# Patient Record
Sex: MALE | Race: WHITE | NOT HISPANIC OR LATINO | Employment: OTHER | URBAN - METROPOLITAN AREA
[De-identification: names, ages, dates, MRNs, and addresses within clinical notes are randomized per-mention and may not be internally consistent; named-entity substitution may affect disease eponyms.]

---

## 2024-06-03 ENCOUNTER — OFFICE VISIT (OUTPATIENT)
Dept: ORTHOPEDIC SURGERY | Facility: CLINIC | Age: 44
End: 2024-06-03
Payer: OTHER GOVERNMENT

## 2024-06-03 VITALS — WEIGHT: 235 LBS | HEIGHT: 71 IN | BODY MASS INDEX: 32.9 KG/M2

## 2024-06-03 DIAGNOSIS — M25.512 LEFT SHOULDER PAIN, UNSPECIFIED CHRONICITY: Primary | ICD-10-CM

## 2024-06-03 PROCEDURE — 99203 OFFICE O/P NEW LOW 30 MIN: CPT | Performed by: ORTHOPAEDIC SURGERY

## 2024-06-03 PROCEDURE — 73030 X-RAY EXAM OF SHOULDER: CPT | Performed by: ORTHOPAEDIC SURGERY

## 2024-06-03 NOTE — PROGRESS NOTES
Subjective:     Patient ID: Justen Brooks is a 44 y.o. male.    Chief Complaint:  Left shoulder pain, new patient  History of Present Illness  Justen Brooks presents to clinic today for evaluation of left shoulder pain, states that back in 2011 he was diagnosed with a SLAP tear that was treated surgically that point in time, had reasonable improvement of symptoms afterwards but over the last 3 months he started noticing increasing pain as well as stiffness and sharpness to the anterior and anterolateral aspects of left shoulder rates as a 2-5 out of 10 shooting in nature, does note some occasional clicking as well as sharp pain particular with resisted activities out away from his body.  He denies any recent injections, minimal improvement with anti-inflammatory medications, activity modification, and home exercises that he has done for greater than 12 weeks.  He denies any fevers chills or sweats.  Denies any numbness or tingling to the left upper extremity.  Denies associated neck pain.     Social History     Occupational History    Not on file   Tobacco Use    Smoking status: Never     Passive exposure: Never    Smokeless tobacco: Never   Vaping Use    Vaping status: Never Used   Substance and Sexual Activity    Alcohol use: No    Drug use: No    Sexual activity: Yes     Partners: Female     Birth control/protection: Vasectomy      Past Medical History:   Diagnosis Date    Acromioclavicular separation 2005    shoulder dislocations    Dislocation, shoulder [id number]    about 150 dislocations    Hip arthrosis 2014    diagnosed right hip impingement    Periarthritis of shoulder 2011    post SLAP repair     Past Surgical History:   Procedure Laterality Date    APPENDECTOMY N/A 09/06/2016    Procedure: APPENDECTOMY LAPAROSCOPIC REPAIR OF UMBILICAL HERNIA;  Surgeon: Jose Carlos Lyn MD;  Location: [facility];  Service:     HERNIA REPAIR      SHOULDER ARTHROSCOPY W/ SUPERIOR LABRAL ANTERIOR POSTERIOR LESION REPAIR  "     SHOULDER SURGERY  July 2011    SLAP repair    VASECTOMY         History reviewed. No pertinent family history.      Review of Systems        Objective:  Vitals:    06/03/24 1522   Weight: 107 kg (235 lb)   Height: 180.3 cm (71\")         06/03/24  1522   Weight: 107 kg (235 lb)     Body mass index is 32.78 kg/m².  Physical Exam    Vital signs reviewed.   General: No acute distress, alert and oriented  Eyes: conjunctiva clear; pupils equally round and reactive  ENT: external ears and nose atraumatic; oropharynx clear  CV: no peripheral edema  Resp: normal respiratory effort  Skin: no rashes or wounds; normal turgor  Psych: mood and affect appropriate; recent and remote memory intact        Ortho Exam     Left shoulder-active forward flexion 175 degrees with 4+ out of 5 strength, external rotation 55 degrees 4+ out of 5 strength, internal rotation T10, 5-5 strength of belly press test.  Significantly positive Yergason and speeds, positive Flint's, minimal tenderness over AC joint with negative crossarm test.  Negative drop arm test, negative external rotation lag sign.  Positive deltoid firing all 3 components.  Brisk cap refill all digits, 1+ radial pulse left wrist.  Positive sensation light touch all distributions left hand symmetric to the right.    Imaging:  Left Shoulder X-Ray  Indication: Pain  AP, scapular Y, and axillary lateral views    Findings:  No fracture  No bony lesion  Normal soft tissues  Minimal glenohumeral chondral joint space narrowing with minimal evidence of reactive bone formation at the inferior glenoid on AP view only, no significant humeral head elevation appreciated    No prior studies were available for comparison.    Assessment:        1. Left shoulder pain, unspecified chronicity           Plan:          Discussed treatment options at length with patient at today's visit.  Reviewed findings from x-rays today and discussed with patient that my concern based on his physical exam as " well as a significant history is for recurrence of SLAP lesion.  At this point in time I recommend advanced imaging with MR arthrogram left shoulder for further evaluation in detail.  Recommended basic home exercises to continue working on motion and strength as tolerated  MR arthrogram left shoulder ordered at this time  Follow up: After MRI to review results and discuss options      Justen Reid Todd was in agreement with plan and had all questions answered.     Orders:  Orders Placed This Encounter   Procedures    XR Shoulder 2+ View Left    FL Contrast Injection CT / MRI    MRI shoulder left arthrogram    IMAGING SCANNED    IMAGING SCANNED    IMAGING SCANNED    IMAGING SCANNED       Medications:  No orders of the defined types were placed in this encounter.      Followup:  No follow-ups on file.    Diagnoses and all orders for this visit:    1. Left shoulder pain, unspecified chronicity (Primary)  -     XR Shoulder 2+ View Left  -     FL Contrast Injection CT / MRI; Future  -     MRI shoulder left arthrogram; Future    Other orders  -     IMAGING SCANNED  -     IMAGING SCANNED  -     IMAGING SCANNED  -     IMAGING SCANNED          Dictated utilizing Dragon dictation

## 2024-06-05 ENCOUNTER — PATIENT ROUNDING (BHMG ONLY) (OUTPATIENT)
Dept: ORTHOPEDIC SURGERY | Facility: CLINIC | Age: 44
End: 2024-06-05
Payer: OTHER GOVERNMENT

## 2024-06-05 NOTE — PROGRESS NOTES
A My-Chart message has been sent to the patient for PATIENT ROUNDING with [facility].

## 2024-06-07 ENCOUNTER — TELEPHONE (OUTPATIENT)
Dept: ORTHOPEDIC SURGERY | Facility: CLINIC | Age: 44
End: 2024-06-07
Payer: OTHER GOVERNMENT

## 2024-06-07 NOTE — TELEPHONE ENCOUNTER
[facility] received a fax that requires your attention. The document has been indexed to the patient’s chart for your review.      Reason for sending: [facility]  REC REQUEST    Documents Description: [facility] REC CRYILSS-OL-8.4.24    [facility] of S[facility]    Date Indexed: 6.7.24    Notes (if needed): WANTS RECORDS FROM 6.3.24 APPT, PLEASE FAX [phone]

## 2024-06-19 ENCOUNTER — HOSPITAL ENCOUNTER (OUTPATIENT)
Dept: GENERAL RADIOLOGY | Facility: HOSPITAL | Age: 44
Discharge: HOME | End: 2024-06-19
Payer: OTHER GOVERNMENT

## 2024-06-19 ENCOUNTER — HOSPITAL ENCOUNTER (OUTPATIENT)
Dept: MRI IMAGING | Facility: HOSPITAL | Age: 44
Discharge: HOME | End: 2024-06-19
Payer: OTHER GOVERNMENT

## 2024-06-19 DIAGNOSIS — M25.512 LEFT SHOULDER PAIN, UNSPECIFIED CHRONICITY: ICD-10-CM

## 2024-06-19 PROCEDURE — 0 GADOBENATE DIMEGLUMINE 529 MG/ML SOLUTION: Performed by: ORTHOPAEDIC SURGERY

## 2024-06-19 PROCEDURE — 77002 NEEDLE LOCALIZATION BY XRAY: CPT

## 2024-06-19 PROCEDURE — A9577 INJ MULTIHANCE: HCPCS | Performed by: ORTHOPAEDIC SURGERY

## 2024-06-19 PROCEDURE — 25510000001 IOPAMIDOL 61 % SOLUTION: Performed by: ORTHOPAEDIC SURGERY

## 2024-06-19 PROCEDURE — 73222 MRI JOINT UPR EXTREM W/DYE: CPT

## 2024-06-19 RX ORDER — LIDOCAINE HYDROCHLORIDE 10 MG/ML
7 INJECTION, SOLUTION INFILTRATION; PERINEURAL ONCE
Status: DISCONTINUED | OUTPATIENT
Start: 2024-06-19 | End: 2024-06-20 | Stop reason: HOSPADM

## 2024-06-19 RX ADMIN — GADOBENATE DIMEGLUMINE 1 ML: 529 INJECTION, SOLUTION INTRAVENOUS at 12:05

## 2024-06-19 RX ADMIN — IOPAMIDOL 8 ML: 612 INJECTION, SOLUTION INTRAVENOUS at 11:52

## 2024-06-27 ENCOUNTER — OFFICE VISIT (OUTPATIENT)
Dept: ORTHOPEDIC SURGERY | Facility: CLINIC | Age: 44
End: 2024-06-27
Payer: OTHER GOVERNMENT

## 2024-06-27 VITALS — BODY MASS INDEX: 32.9 KG/M2 | WEIGHT: 235 LBS | HEIGHT: 71 IN

## 2024-06-27 DIAGNOSIS — S43.432A SUPERIOR GLENOID LABRUM LESION OF LEFT SHOULDER, INITIAL ENCOUNTER: ICD-10-CM

## 2024-06-27 DIAGNOSIS — M75.22 BICEPS TENDONITIS ON LEFT: Primary | ICD-10-CM

## 2024-06-27 DIAGNOSIS — M75.112 INCOMPLETE TEAR OF LEFT ROTATOR CUFF, UNSPECIFIED WHETHER TRAUMATIC: ICD-10-CM

## 2024-06-27 NOTE — PROGRESS NOTES
Subjective:     Patient ID: Justen Brooks is a 44 y.o. male.    Chief Complaint:  Follow-up left shoulder pain, follow-up MRI    History of Present Illness  History of Present Illness  The patient returns to clinic today for follow-up evaluation in regards to his left shoulder.    The patient continues to experience significant pain in his left shoulder, primarily localized over the anterior and anterolateral aspect, accompanied by some radiation to the superior aspect of his left shoulder. He quantifies the pain as moderate in intensity. He also reports associated clicking and catching with sharp pain during resisted activities, particularly when away from his body. He quantifies his pain as a 5 to 6 out of 10 at present. Despite no significant improvement with the use of anti-inflammatory medications, activity modification, and home exercises for over 12 weeks, he denies any associated numbness or tingling and denies any radiation of the pain from the shoulder itself.     Social History     Occupational History    Not on file   Tobacco Use    Smoking status: Never     Passive exposure: Never    Smokeless tobacco: Never   Vaping Use    Vaping status: Never Used   Substance and Sexual Activity    Alcohol use: No    Drug use: Never    Sexual activity: Yes     Partners: Female     Birth control/protection: Vasectomy, Hysterectomy      Past Medical History:   Diagnosis Date    Acromioclavicular separation 2005    shoulder dislocations    Dislocation, shoulder [id number]    about 150 dislocations    Frozen shoulder     Hip arthrosis 2014    diagnosed right hip impingement    Periarthritis of shoulder 2011    post SLAP repair     Past Surgical History:   Procedure Laterality Date    APPENDECTOMY N/A 09/06/2016    Procedure: APPENDECTOMY LAPAROSCOPIC REPAIR OF UMBILICAL HERNIA;  Surgeon: Jose Carlos Lyn MD;  Location: [facility];  Service:     HERNIA REPAIR      SHOULDER ARTHROSCOPY W/ SUPERIOR LABRAL ANTERIOR POSTERIOR  "LESION REPAIR      SHOULDER SURGERY  July 2011    SLAP repair    VASECTOMY         History reviewed. No pertinent family history.      Review of Systems        Objective:  Vitals:    06/27/24 0901   Weight: 107 kg (235 lb)   Height: 180.3 cm (71\")         06/27/24 0901   Weight: 107 kg (235 lb)     Body mass index is 32.78 kg/m².  Physical Exam    Vital signs reviewed.   General: No acute distress, alert and oriented  Eyes: conjunctiva clear; pupils equally round and reactive  ENT: external ears and nose atraumatic; oropharynx clear  CV: no peripheral edema  Resp: normal respiratory effort  Skin: no rashes or wounds; normal turgor  Psych: mood and affect appropriate; recent and remote memory intact          Physical Exam  Left shoulder- active forward flexion in the left shoulder is 175 to 175 degrees, 4+ out of 5 strength, external rotation 55 degrees, 4 plus out of 5 strength, internal rotation T10, 5 out of 5 strength, belly press test with negative bear hug sign. Significantly positive Yergason and Speed's, positive Breckinridge's, no tenderness over AC joint, but negative cross arm test, negative drop arm test, negative external rotation lag sign, positive deltoid in all three components.         Imaging:    FL Contrast Injection CT / MRI    Result Date: 6/19/2024  Technically successful left shoulder arthrogram utilizing fluoroscopic guidance. MRI to follow. Please see left shoulder MRI for complete read. Report dictated by: Tramaine Son DNP  I have personally reviewed this case and agree with the findings above: Electronically Signed: Subhash Webster MD  6/19/2024 3:29 PM EDT  Workstation ID: [id number]    MRI Shoulder Left Arthrogram    Result Date: 6/19/2024  Impression: 1.Evidence of partial-thickness undersurface and intrasubstance tear involving the superior and middle fibers of the subscapularis component of the cuff. No definitive full-thickness rotator cuff tear is seen. 2.Evidence for associated biceps " pulley/sleeve injury with slight partial displacement of the proximal biceps tendon. Changes of biceps tendinopathy are also noted. There is no complete biceps tendon tear or distal retraction. 3.There are residual signal changes associated with the superior labrum related to prior SLAP type tear and postoperative change. 4.There also appear to be changes suggesting the sequelae of prior transient anterior inferior glenohumeral dislocation with remote Bankart and Hill-Sachs injuries as well as postoperative changes along the anterior inferior glenoid/labrum. 5.Mild hypertrophic age-related changes of the acromioclavicular joint with mild edema suggesting chronic acromioclavicular strain or chronic stress related changes. 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 Electronically Signed: Subhash Webster MD  6/19/2024 2:37 PM EDT  Workstation ID: [id number]    Review of outside MR arthrogram left shoulder include reviewed imaging as well as radiology report indicates partial-thickness tearing of the subscapularis but no evidence of full-thickness rotator cuff tear, significant biceps tendinopathy with displacement of proximal biceps tendon as well as SLAP tear superior labrum.  Prior evidence of shoulder instability with remote Bankart and Hill-Sachs lesions.  Mild early degenerative change of glenohumeral joint.    Assessment:        1. Biceps tendonitis on left    2. Superior glenoid labrum lesion of left shoulder, initial encounter    3. Incomplete tear of left rotator cuff, unspecified whether traumatic           Plan:          Assessment & Plan  1. Left shoulder pain.  Upon reviewing the patient's MR arthrogram findings, it was observed that he has a remote history of Bankart injury and Hill-Sachs defect, consistent with his significant prior history of shoulder instability. Currently, he is not experiencing instability, but there is evidence of significant biceps tendon pathology, partial subluxation  from the bicipital groove, and a SLAP lesion. The majority of his clinical symptoms are attributable to his pain. A discussion was held regarding the options of observation, injection, and surgical treatment. The patient expressed a desire to proceed with surgery at this time. The proposed plan for surgery is a left shoulder arthroscopy with biceps tenodesis, subacromial decompression, possible rotator cuff repair, and all associated procedures.      Plan will be for left shoulder arthroscopy, biceps tenodesis, subacromial decompression, possible rotator cuff debridement versus repair and all associated procedures.  I reviewed risks benefits and alternatives the procedure with risks including not limited to neurovascular damage, bleeding, infection, chronic pain, re-tear rotator cuff, failure of healing rotator cuff, loss of motion, weakness, stiffness, instability, biceps sag, DVT, pulmonary embolus, death, stroke, complex regional pain syndrome, myocardial infarction, need for additional procedures. He understood all these had all questions answered.  Patient verbally consented to proceed with surgery.  No guarantees were given regarding results of surgery.  We will have patient medically optimized by primary care physician and proceed with surgery at next available date.     Patient denies history of DVT or pulmonary embolus, denies cardiac history, he is not diabetic    Justen Brooks was in agreement with plan and had all questions answered.     Orders:  No orders of the defined types were placed in this encounter.      Medications:  No orders of the defined types were placed in this encounter.      Followup:  No follow-ups on file.    Diagnoses and all orders for this visit:    1. Biceps tendonitis on left (Primary)    2. Superior glenoid labrum lesion of left shoulder, initial encounter    3. Incomplete tear of left rotator cuff, unspecified whether traumatic          Dictated utilizing Dragon dictation      Patient or patient representative verbalized consent for the use of Ambient Listening during the visit with  Casey Vicente MD for chart documentation. 6/28/2024  09:29 EDT

## 2024-06-28 RX ORDER — MELOXICAM 7.5 MG/1
15 TABLET ORAL ONCE
OUTPATIENT
Start: 2024-06-28 | End: 2024-06-28

## 2024-06-28 RX ORDER — PREGABALIN 150 MG/1
150 CAPSULE ORAL ONCE
OUTPATIENT
Start: 2024-06-28 | End: 2024-06-28

## 2024-06-28 RX ORDER — ACETAMINOPHEN 325 MG/1
1000 TABLET ORAL ONCE
OUTPATIENT
Start: 2024-06-28 | End: 2024-06-28

## 2024-07-02 PROBLEM — M75.22 BICEPS TENDONITIS ON LEFT: Status: ACTIVE | Noted: 2024-06-27

## 2024-07-02 PROBLEM — S43.432A SUPERIOR GLENOID LABRUM LESION OF LEFT SHOULDER: Status: ACTIVE | Noted: 2024-06-27

## 2024-07-02 PROBLEM — M75.112 INCOMPLETE TEAR OF LEFT ROTATOR CUFF: Status: ACTIVE | Noted: 2024-06-27

## 2024-07-26 ENCOUNTER — PRE-ADMISSION TESTING (OUTPATIENT)
Dept: PREADMISSION TESTING | Facility: HOSPITAL | Age: 44
End: 2024-07-26
Payer: OTHER GOVERNMENT

## 2024-07-26 VITALS
OXYGEN SATURATION: 99 % | HEIGHT: 72 IN | HEART RATE: 90 BPM | WEIGHT: 244 LBS | SYSTOLIC BLOOD PRESSURE: 140 MMHG | BODY MASS INDEX: 33.05 KG/M2 | RESPIRATION RATE: 16 BRPM | DIASTOLIC BLOOD PRESSURE: 64 MMHG

## 2024-07-26 LAB — HBA1C MFR BLD: 5.86 % (ref 4.8–5.6)

## 2024-07-26 PROCEDURE — 85025 COMPLETE CBC W/AUTO DIFF WBC: CPT | Performed by: ORTHOPAEDIC SURGERY

## 2024-07-26 PROCEDURE — 83036 HEMOGLOBIN GLYCOSYLATED A1C: CPT | Performed by: ORTHOPAEDIC SURGERY

## 2024-07-26 PROCEDURE — 85610 PROTHROMBIN TIME: CPT | Performed by: ORTHOPAEDIC SURGERY

## 2024-07-26 PROCEDURE — 85730 THROMBOPLASTIN TIME PARTIAL: CPT | Performed by: ORTHOPAEDIC SURGERY

## 2024-07-26 PROCEDURE — 80048 BASIC METABOLIC PNL TOTAL CA: CPT | Performed by: ORTHOPAEDIC SURGERY

## 2024-07-26 NOTE — PAT
Pt here for PAT visit.  Pre-op tests completed, chg soap given, and instructions reviewed.  Instructed clears until 2 hrs prior to arrival time, voiced understanding. Instructed on Benzoyl Peroxide, handout given and pt directed to pharmacy

## 2024-07-26 NOTE — DISCHARGE INSTRUCTIONS
PRE-ADMISSION TESTING INSTRUCTIONS FOR ADULTS    Take these medications the morning of surgery with a small sip of water:  nothing       Do not take any insulin or diabetes medications the morning of surgery.      No aspirin, advil, aleve, ibuprofen, naproxen, diet pills, decongestants, or herbal/vitamins for a week prior to surgery.       Tylenol/Acetaminophen is okay to take if needed.    General Instructions:    DO NOT EAT SOLID FOOD AFTER MIDNIGHT THE NIGHT BEFORE SURGERY. No gum, mints, or hard candy after midnight the night before surgery.  You may drink clear liquids the day of surgery up until 2 hours before your arrival time.  Clear liquids are liquids you can see through. Nothing RED in color.    Plain water    Sports drinks      Gelatin (Jell-O)  Fruit juices without pulp such as white grape juice and apple juice  Popsicles that contain no fruit or yogurt  Tea or coffee (no cream or milk added)    It is beneficial for you to have a clear drink that contains carbohydrates 2 hours before your arrival time.  We suggest a 20 ounce bottle of Gatorade or Powerade for non-diabetic patients or a 20 ounce bottle of Gatorade Zero or Powerade Zero for diabetic patients.     Patients who avoid smoking, chewing tobacco and alcohol for 4 weeks prior to surgery have a reduced risk of post-operative complications.  If at all possible, quit smoking as many days before surgery as you can.    Do not smoke, use chewing tobacco or drink alcohol the day of surgery    Bring your C-PAP/ BI-PAP machine if you use one.  Wear clean comfortable clothes.  Do not wear contact lenses, lotion, deodorant, or make-up.  Bring a case for your glasses if applicable. You may brush your teeth the morning of surgery.  You may wear dentures/partials, do not put adhesive/glue on them.  Leave all other jewelry and valuables at home.      Preventing a Surgical Site Infection:    Shower the night before and on the morning of surgery using the  chlorhexidine soap you were given.  Use a clean washcloth with the soap.  Place clean sheets on your bed after showering the night before surgery. Do not use the CHG soap on your hair, face, or private areas. Wash your body gently for five (5) minutes. Do not scrub your skin.  Dry with a clean towel and dress in clean clothing.  Do not shave the surgical area for 10 days-2 weeks prior to surgery  because the razor can irritate skin and make it easier to develop an infection.  Make sure you, your family, and all healthcare providers clean their hands with soap and water or an alcohol based hand  before caring for you or your wound.      Day of surgery:    Your surgeon’s office will advise you of your arrival time for the day of surgery.    Upon arrival, a Pre-op nurse and Anesthesia provider will review your health history, obtain vital signs, and answer questions you may have. The anesthesia provider will also discuss the type of anesthesia that will be needed for your procedure, which may include general anesthesia. The only belongings needed at this time will be your home medications and if applicable your C-PAP/BI-PAP machine.  If you are staying overnight your family can leave the rest of your belongings in the car and bring them to your room later.  A Pre-op nurse will start an IV and you may receive medication in preparation for surgery, including something to help you relax.  Your family will be able to see you in the Pre-op area.  While you are in surgery your family should notify the waiting room  if they leave the waiting room area and provide a contact phone number.    IF you have any questions, you can call the Pre-Admission Department at [phone] or your surgeon's office.  Notify your surgeon if  you become sick, have a fever, productive cough, or cannot be here the day of surgery    Please be aware that surgery does come with discomfort.  We want to make every effort to  control your discomfort so please discuss any uncontrolled symptoms with your nurse.   Your doctor will most likely have prescribed pain medications.      If you are going home after surgery, you will receive individualized written care instructions before being discharged.  A responsible adult (over the age of 18) must drive you to and from the hospital on the day of your surgery and stay with you for 24 hours after anesthesia.    If you are staying overnight following surgery, you will be transported to your hospital room following the recovery period.  [facility] has all private rooms.    You may receive a survey regarding the care you received. Your feedback is very important and will be used to collect the necessary data to help us to continue to provide excellent care.     Deductibles and co-payments are collected on the day of service. Please be prepared to pay the required co-pay, deductible or deposit on the day of service as defined by your plan.

## 2024-08-01 ENCOUNTER — ANESTHESIA EVENT (OUTPATIENT)
Dept: PERIOP | Facility: HOSPITAL | Age: 44
End: 2024-08-01
Payer: OTHER GOVERNMENT

## 2024-08-02 ENCOUNTER — HOSPITAL ENCOUNTER (OUTPATIENT)
Facility: HOSPITAL | Age: 44
Setting detail: HOSPITAL OUTPATIENT SURGERY
Discharge: HOME | End: 2024-08-02
Attending: ORTHOPAEDIC SURGERY | Admitting: ORTHOPAEDIC SURGERY
Payer: OTHER GOVERNMENT

## 2024-08-02 ENCOUNTER — ANESTHESIA (OUTPATIENT)
Dept: PERIOP | Facility: HOSPITAL | Age: 44
End: 2024-08-02
Payer: OTHER GOVERNMENT

## 2024-08-02 VITALS
OXYGEN SATURATION: 91 % | SYSTOLIC BLOOD PRESSURE: 113 MMHG | BODY MASS INDEX: 32.09 KG/M2 | TEMPERATURE: 97.3 F | DIASTOLIC BLOOD PRESSURE: 72 MMHG | HEART RATE: 73 BPM | RESPIRATION RATE: 16 BRPM | WEIGHT: 236.6 LBS

## 2024-08-02 DIAGNOSIS — S43.432A SUPERIOR GLENOID LABRUM LESION OF LEFT SHOULDER, INITIAL ENCOUNTER: ICD-10-CM

## 2024-08-02 DIAGNOSIS — M75.22 BICEPS TENDONITIS ON LEFT: ICD-10-CM

## 2024-08-02 DIAGNOSIS — M75.112 INCOMPLETE TEAR OF LEFT ROTATOR CUFF, UNSPECIFIED WHETHER TRAUMATIC: ICD-10-CM

## 2024-08-02 PROCEDURE — 25010000002 CEFAZOLIN PER 500 MG: Performed by: ORTHOPAEDIC SURGERY

## 2024-08-02 PROCEDURE — 25810000003 LACTATED RINGERS PER 1000 ML: Performed by: NURSE ANESTHETIST, CERTIFIED REGISTERED

## 2024-08-02 PROCEDURE — 25010000002 SUCCINYLCHOLINE PER 20 MG: Performed by: NURSE ANESTHETIST, CERTIFIED REGISTERED

## 2024-08-02 PROCEDURE — 25010000002 ONDANSETRON PER 1 MG: Performed by: NURSE ANESTHETIST, CERTIFIED REGISTERED

## 2024-08-02 PROCEDURE — 25010000002 MIDAZOLAM PER 1MG: Performed by: NURSE ANESTHETIST, CERTIFIED REGISTERED

## 2024-08-02 PROCEDURE — 25010000002 PROPOFOL 200 MG/20ML EMULSION: Performed by: NURSE ANESTHETIST, CERTIFIED REGISTERED

## 2024-08-02 PROCEDURE — 25010000002 EPINEPHRINE PER 0.1 MG: Performed by: ORTHOPAEDIC SURGERY

## 2024-08-02 PROCEDURE — 25010000002 SUGAMMADEX 200 MG/2ML SOLUTION: Performed by: NURSE ANESTHETIST, CERTIFIED REGISTERED

## 2024-08-02 PROCEDURE — 25010000002 HYDROMORPHONE 1 MG/ML SOLUTION: Performed by: NURSE ANESTHETIST, CERTIFIED REGISTERED

## 2024-08-02 PROCEDURE — 29827 SHO ARTHRS SRG RT8TR CUF RPR: CPT | Performed by: ORTHOPAEDIC SURGERY

## 2024-08-02 PROCEDURE — 23430 REPAIR BICEPS TENDON: CPT | Performed by: ORTHOPAEDIC SURGERY

## 2024-08-02 PROCEDURE — 25010000002 BUPIVACAINE (PF) 0.5 % SOLUTION: Performed by: NURSE ANESTHETIST, CERTIFIED REGISTERED

## 2024-08-02 PROCEDURE — 25010000002 DEXAMETHASONE PER 1 MG: Performed by: NURSE ANESTHETIST, CERTIFIED REGISTERED

## 2024-08-02 PROCEDURE — C1713 ANCHOR/SCREW BN/BN,TIS/BN: HCPCS | Performed by: ORTHOPAEDIC SURGERY

## 2024-08-02 DEVICE — ULTRATAPE SUTURE COBRAID BLUE, 6                                    PER BOX
Type: IMPLANTABLE DEVICE | Site: SHOULDER | Status: FUNCTIONAL
Brand: ULTRATAPE

## 2024-08-02 DEVICE — 2.8MM Q-FIX ALL SUTURE ANCHOR
Type: IMPLANTABLE DEVICE | Site: SHOULDER | Status: FUNCTIONAL
Brand: Q-FIX

## 2024-08-02 RX ORDER — ONDANSETRON 4 MG/1
4 TABLET, FILM COATED ORAL EVERY 8 HOURS PRN
Qty: 30 TABLET | Refills: 0 | Status: SHIPPED | OUTPATIENT
Start: 2024-08-02 | End: 2024-08-02

## 2024-08-02 RX ORDER — SODIUM CHLORIDE 0.9 % (FLUSH) 0.9 %
10 SYRINGE (ML) INJECTION EVERY 12 HOURS SCHEDULED
Status: DISCONTINUED | OUTPATIENT
Start: 2024-08-02 | End: 2024-08-02 | Stop reason: HOSPADM

## 2024-08-02 RX ORDER — FAMOTIDINE 10 MG/ML
20 INJECTION, SOLUTION INTRAVENOUS
Status: COMPLETED | OUTPATIENT
Start: 2024-08-02 | End: 2024-08-02

## 2024-08-02 RX ORDER — LIDOCAINE HYDROCHLORIDE 10 MG/ML
0.5 INJECTION, SOLUTION INFILTRATION; PERINEURAL ONCE AS NEEDED
Status: DISCONTINUED | OUTPATIENT
Start: 2024-08-02 | End: 2024-08-02 | Stop reason: HOSPADM

## 2024-08-02 RX ORDER — BUPIVACAINE HYDROCHLORIDE 5 MG/ML
INJECTION, SOLUTION EPIDURAL; INTRACAUDAL
Status: COMPLETED | OUTPATIENT
Start: 2024-08-02 | End: 2024-08-02

## 2024-08-02 RX ORDER — HYDROCODONE BITARTRATE AND ACETAMINOPHEN 7.5; 325 MG/1; MG/1
1 TABLET ORAL ONCE AS NEEDED
Status: DISCONTINUED | OUTPATIENT
Start: 2024-08-02 | End: 2024-08-02 | Stop reason: HOSPADM

## 2024-08-02 RX ORDER — SENNOSIDES A AND B 8.6 MG/1
1 TABLET, FILM COATED ORAL NIGHTLY
Qty: 20 TABLET | Refills: 0 | Status: SHIPPED | OUTPATIENT
Start: 2024-08-02

## 2024-08-02 RX ORDER — ONDANSETRON 2 MG/ML
4 INJECTION INTRAMUSCULAR; INTRAVENOUS ONCE AS NEEDED
Status: COMPLETED | OUTPATIENT
Start: 2024-08-02 | End: 2024-08-02

## 2024-08-02 RX ORDER — MELOXICAM 7.5 MG/1
15 TABLET ORAL ONCE
Status: COMPLETED | OUTPATIENT
Start: 2024-08-02 | End: 2024-08-02

## 2024-08-02 RX ORDER — DEXMEDETOMIDINE HYDROCHLORIDE 100 UG/ML
INJECTION, SOLUTION INTRAVENOUS
Status: COMPLETED | OUTPATIENT
Start: 2024-08-02 | End: 2024-08-02

## 2024-08-02 RX ORDER — LIDOCAINE HYDROCHLORIDE 20 MG/ML
INJECTION, SOLUTION INFILTRATION; PERINEURAL AS NEEDED
Status: DISCONTINUED | OUTPATIENT
Start: 2024-08-02 | End: 2024-08-02 | Stop reason: SURG

## 2024-08-02 RX ORDER — MIDAZOLAM HYDROCHLORIDE 2 MG/2ML
1 INJECTION, SOLUTION INTRAMUSCULAR; INTRAVENOUS
Status: COMPLETED | OUTPATIENT
Start: 2024-08-02 | End: 2024-08-02

## 2024-08-02 RX ORDER — HYDROCODONE BITARTRATE AND ACETAMINOPHEN 5; 325 MG/1; MG/1
1 TABLET ORAL EVERY 4 HOURS PRN
Qty: 40 TABLET | Refills: 0 | Status: SHIPPED | OUTPATIENT
Start: 2024-08-02

## 2024-08-02 RX ORDER — SENNOSIDES A AND B 8.6 MG/1
1 TABLET, FILM COATED ORAL NIGHTLY
Qty: 20 TABLET | Refills: 0 | Status: SHIPPED | OUTPATIENT
Start: 2024-08-02 | End: 2024-08-02

## 2024-08-02 RX ORDER — OXYCODONE HYDROCHLORIDE AND ACETAMINOPHEN 5; 325 MG/1; MG/1
1 TABLET ORAL EVERY 4 HOURS PRN
Qty: 30 TABLET | Refills: 0 | Status: SHIPPED | OUTPATIENT
Start: 2024-08-02 | End: 2024-08-02

## 2024-08-02 RX ORDER — ACETAMINOPHEN 500 MG
1000 TABLET ORAL ONCE
Status: COMPLETED | OUTPATIENT
Start: 2024-08-02 | End: 2024-08-02

## 2024-08-02 RX ORDER — SUCCINYLCHOLINE CHLORIDE 20 MG/ML
INJECTION INTRAMUSCULAR; INTRAVENOUS AS NEEDED
Status: DISCONTINUED | OUTPATIENT
Start: 2024-08-02 | End: 2024-08-02 | Stop reason: SURG

## 2024-08-02 RX ORDER — ONDANSETRON 4 MG/1
4 TABLET, FILM COATED ORAL EVERY 8 HOURS PRN
Qty: 30 TABLET | Refills: 0 | Status: SHIPPED | OUTPATIENT
Start: 2024-08-02

## 2024-08-02 RX ORDER — DEXAMETHASONE SODIUM PHOSPHATE 10 MG/ML
8 INJECTION INTRAMUSCULAR; INTRAVENOUS ONCE AS NEEDED
Status: COMPLETED | OUTPATIENT
Start: 2024-08-02 | End: 2024-08-02

## 2024-08-02 RX ORDER — SODIUM CHLORIDE, SODIUM LACTATE, POTASSIUM CHLORIDE, CALCIUM CHLORIDE 600; 310; 30; 20 MG/100ML; MG/100ML; MG/100ML; MG/100ML
9 INJECTION, SOLUTION INTRAVENOUS CONTINUOUS PRN
Status: DISCONTINUED | OUTPATIENT
Start: 2024-08-02 | End: 2024-08-02 | Stop reason: HOSPADM

## 2024-08-02 RX ORDER — LIDOCAINE HYDROCHLORIDE AND EPINEPHRINE 10; 10 MG/ML; UG/ML
INJECTION, SOLUTION INFILTRATION; PERINEURAL AS NEEDED
Status: DISCONTINUED | OUTPATIENT
Start: 2024-08-02 | End: 2024-08-02 | Stop reason: HOSPADM

## 2024-08-02 RX ORDER — PROPOFOL 10 MG/ML
INJECTION, EMULSION INTRAVENOUS AS NEEDED
Status: DISCONTINUED | OUTPATIENT
Start: 2024-08-02 | End: 2024-08-02 | Stop reason: SURG

## 2024-08-02 RX ORDER — SODIUM CHLORIDE 0.9 % (FLUSH) 0.9 %
10 SYRINGE (ML) INJECTION AS NEEDED
Status: DISCONTINUED | OUTPATIENT
Start: 2024-08-02 | End: 2024-08-02 | Stop reason: HOSPADM

## 2024-08-02 RX ORDER — ROCURONIUM BROMIDE 10 MG/ML
INJECTION, SOLUTION INTRAVENOUS AS NEEDED
Status: DISCONTINUED | OUTPATIENT
Start: 2024-08-02 | End: 2024-08-02 | Stop reason: SURG

## 2024-08-02 RX ORDER — EPINEPHRINE 1 MG/ML
INJECTION, SOLUTION, CONCENTRATE INTRAVENOUS AS NEEDED
Status: DISCONTINUED | OUTPATIENT
Start: 2024-08-02 | End: 2024-08-02 | Stop reason: HOSPADM

## 2024-08-02 RX ORDER — PREGABALIN 75 MG/1
150 CAPSULE ORAL ONCE
Status: COMPLETED | OUTPATIENT
Start: 2024-08-02 | End: 2024-08-02

## 2024-08-02 RX ORDER — OXYCODONE HYDROCHLORIDE AND ACETAMINOPHEN 5; 325 MG/1; MG/1
1 TABLET ORAL EVERY 4 HOURS PRN
Qty: 30 TABLET | Refills: 0 | Status: SHIPPED | OUTPATIENT
Start: 2024-08-02 | End: 2024-08-02 | Stop reason: HOSPADM

## 2024-08-02 RX ORDER — ASPIRIN 81 MG/1
81 TABLET ORAL DAILY
Qty: 14 TABLET | Refills: 0 | Status: SHIPPED | OUTPATIENT
Start: 2024-08-02 | End: 2024-08-02

## 2024-08-02 RX ORDER — SODIUM CHLORIDE, SODIUM LACTATE, POTASSIUM CHLORIDE, CALCIUM CHLORIDE 600; 310; 30; 20 MG/100ML; MG/100ML; MG/100ML; MG/100ML
100 INJECTION, SOLUTION INTRAVENOUS ONCE
Status: DISCONTINUED | OUTPATIENT
Start: 2024-08-02 | End: 2024-08-02 | Stop reason: HOSPADM

## 2024-08-02 RX ORDER — ASPIRIN 81 MG/1
81 TABLET ORAL DAILY
Qty: 14 TABLET | Refills: 0 | Status: SHIPPED | OUTPATIENT
Start: 2024-08-02

## 2024-08-02 RX ORDER — ONDANSETRON 2 MG/ML
4 INJECTION INTRAMUSCULAR; INTRAVENOUS ONCE AS NEEDED
Status: DISCONTINUED | OUTPATIENT
Start: 2024-08-02 | End: 2024-08-02 | Stop reason: HOSPADM

## 2024-08-02 RX ADMIN — CEFAZOLIN 2000 MG: 2 INJECTION, POWDER, FOR SOLUTION INTRAVENOUS at 14:12

## 2024-08-02 RX ADMIN — SUGAMMADEX 200 MG: 100 INJECTION, SOLUTION INTRAVENOUS at 15:40

## 2024-08-02 RX ADMIN — PREGABALIN 150 MG: 75 CAPSULE ORAL at 11:02

## 2024-08-02 RX ADMIN — LIDOCAINE HYDROCHLORIDE 50 MG: 20 INJECTION, SOLUTION INFILTRATION; PERINEURAL at 14:06

## 2024-08-02 RX ADMIN — ROCURONIUM 25 MG: 50 INJECTION, SOLUTION INTRAVENOUS at 14:17

## 2024-08-02 RX ADMIN — ACETAMINOPHEN 1000 MG: 500 TABLET ORAL at 11:02

## 2024-08-02 RX ADMIN — FAMOTIDINE 20 MG: 10 INJECTION, SOLUTION INTRAVENOUS at 11:00

## 2024-08-02 RX ADMIN — SUCCINYLCHOLINE CHLORIDE 140 MG: 20 INJECTION, SOLUTION INTRAMUSCULAR; INTRAVENOUS at 14:07

## 2024-08-02 RX ADMIN — PROPOFOL 200 MG: 10 INJECTION, EMULSION INTRAVENOUS at 14:06

## 2024-08-02 RX ADMIN — DEXMEDETOMIDINE 20 MCG: 100 INJECTION, SOLUTION, CONCENTRATE INTRAVENOUS at 13:10

## 2024-08-02 RX ADMIN — MIDAZOLAM HYDROCHLORIDE 1 MG: 1 INJECTION, SOLUTION INTRAMUSCULAR; INTRAVENOUS at 12:55

## 2024-08-02 RX ADMIN — DEXAMETHASONE SODIUM PHOSPHATE 8 MG: 10 INJECTION INTRAMUSCULAR; INTRAVENOUS at 11:03

## 2024-08-02 RX ADMIN — MIDAZOLAM HYDROCHLORIDE 1 MG: 1 INJECTION, SOLUTION INTRAMUSCULAR; INTRAVENOUS at 13:00

## 2024-08-02 RX ADMIN — HYDROMORPHONE HYDROCHLORIDE 0.5 MG: 1 INJECTION, SOLUTION INTRAMUSCULAR; INTRAVENOUS; SUBCUTANEOUS at 17:08

## 2024-08-02 RX ADMIN — MELOXICAM 15 MG: 7.5 TABLET ORAL at 11:02

## 2024-08-02 RX ADMIN — ONDANSETRON 4 MG: 2 INJECTION INTRAMUSCULAR; INTRAVENOUS at 11:00

## 2024-08-02 RX ADMIN — SODIUM CHLORIDE, POTASSIUM CHLORIDE, SODIUM LACTATE AND CALCIUM CHLORIDE 9 ML/HR: 600; 310; 30; 20 INJECTION, SOLUTION INTRAVENOUS at 10:55

## 2024-08-02 RX ADMIN — HYDROMORPHONE HYDROCHLORIDE 0.5 MG: 1 INJECTION, SOLUTION INTRAMUSCULAR; INTRAVENOUS; SUBCUTANEOUS at 16:58

## 2024-08-02 RX ADMIN — BUPIVACAINE HYDROCHLORIDE 20 ML: 5 INJECTION, SOLUTION EPIDURAL; INTRACAUDAL; PERINEURAL at 13:10

## 2024-08-02 NOTE — H&P
Orthopedic H&P    Subjective:     Patient ID: Justen Brooks is a 44 y.o. male.    Chief Complaint:  Left shoulder pain, biceps tendinopathy, SLAP lesion, partial-thickness rotator cuff tear    History of Present Illness  History of Present Illness  The patient presents for surgical treatment of left shoulder pain and biceps tendinopathy, SLAP lesion, and partial-thickness rotator cuff tear    The patient is experiencing significant pain in his left shoulder, primarily localized over the anterior and anterolateral aspect, accompanied by some radiation to the superior aspect of his left shoulder. He quantifies the pain as moderate in intensity. He also reports associated clicking and catching with sharp pain during resisted activities, particularly when away from his body. He quantifies his pain as a 5 to 6 out of 10 at present. Despite no significant improvement with the use of anti-inflammatory medications, activity modification, and home exercises for over 12 weeks, he denies any associated numbness or tingling and denies any radiation of the pain from the shoulder itself.    No current facility-administered medications on file prior to encounter.     No current outpatient medications on file prior to encounter.     No Known Allergies       Social History     Occupational History    Not on file   Tobacco Use    Smoking status: Never     Passive exposure: Never    Smokeless tobacco: Never   Vaping Use    Vaping status: Never Used   Substance and Sexual Activity    Alcohol use: No    Drug use: Never    Sexual activity: Yes     Partners: Female     Birth control/protection: Vasectomy, Hysterectomy      Past Medical History:   Diagnosis Date    Acromioclavicular separation 2005    shoulder dislocations    Dislocation, shoulder [id number]    about 150 dislocations    Frozen shoulder     Hip arthrosis 2014    diagnosed right hip impingement    Periarthritis of shoulder 2011    post SLAP repair    PONV (postoperative  nausea and vomiting)     Sleep apnea     CPAP     Past Surgical History:   Procedure Laterality Date    APPENDECTOMY N/A 09/06/2016    Procedure: APPENDECTOMY LAPAROSCOPIC REPAIR OF UMBILICAL HERNIA;  Surgeon: Jose Carlos Lyn MD;  Location: [facility] OR;  Service:     HERNIA REPAIR      SHOULDER ARTHROSCOPY W/ SUPERIOR LABRAL ANTERIOR POSTERIOR LESION REPAIR      SHOULDER SURGERY  July 2011    SLAP repair    VASECTOMY         History reviewed. No pertinent family history.      Review of Systems        Objective:  Vitals:    08/02/24 1039 08/02/24 1047   BP:  129/79   Pulse:  80   Resp:  20   Temp: 98.1 °F (36.7 °C) 98.1 °F (36.7 °C)   TempSrc: Oral Oral   SpO2:  94%   Weight: 106 kg (233 lb 9.6 oz) 107 kg (236 lb 9.6 oz)           08/02/24  1039 08/02/24  1047   Weight: 106 kg (233 lb 9.6 oz) 107 kg (236 lb 9.6 oz)       Body mass index is 32.09 kg/m².  Physical Exam    Vital signs reviewed.   General: No acute distress, alert and oriented  Eyes: conjunctiva clear; pupils equally round and reactive  ENT: external ears and nose atraumatic; oropharynx clear  CV: no peripheral edema  Resp: normal respiratory effort  Skin: no rashes or wounds; normal turgor  Psych: mood and affect appropriate; recent and remote memory intact  Debilities: None        Physical Exam  Left shoulder- active forward flexion in the left shoulder is 175 to 175 degrees, 4+ out of 5 strength, external rotation 55 degrees, 4 plus out of 5 strength, internal rotation T10, 5 out of 5 strength, belly press test with negative bear hug sign. Significantly positive Yergason and Speed's, positive Coplay's, no tenderness over AC joint, but negative cross arm test, negative drop arm test, negative external rotation lag sign, positive deltoid in all three components.         Imaging:    FL Contrast Injection CT / MRI    Result Date: 6/19/2024  Technically successful left shoulder arthrogram utilizing fluoroscopic guidance. MRI to follow. Please see left  shoulder MRI for complete read. Report dictated by: Tramaine Son DNP  I have personally reviewed this case and agree with the findings above: Electronically Signed: Subhash Webster MD  6/19/2024 3:29 PM EDT  Workstation ID: [id number]    MRI Shoulder Left Arthrogram    Result Date: 6/19/2024  Impression: 1.Evidence of partial-thickness undersurface and intrasubstance tear involving the superior and middle fibers of the subscapularis component of the cuff. No definitive full-thickness rotator cuff tear is seen. 2.Evidence for associated biceps pulley/sleeve injury with slight partial displacement of the proximal biceps tendon. Changes of biceps tendinopathy are also noted. There is no complete biceps tendon tear or distal retraction. 3.There are residual signal changes associated with the superior labrum related to prior SLAP type tear and postoperative change. 4.There also appear to be changes suggesting the sequelae of prior transient anterior inferior glenohumeral dislocation with remote Bankart and Hill-Sachs injuries as well as postoperative changes along the anterior inferior glenoid/labrum. 5.Mild hypertrophic age-related changes of the acromioclavicular joint with mild edema suggesting chronic acromioclavicular strain or chronic stress related changes. 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 Electronically Signed: Subhash Webster MD  6/19/2024 2:37 PM EDT  Workstation ID: [id number]    Review of outside MR arthrogram left shoulder include reviewed imaging as well as radiology report indicates partial-thickness tearing of the subscapularis but no evidence of full-thickness rotator cuff tear, significant biceps tendinopathy with displacement of proximal biceps tendon as well as SLAP tear superior labrum.  Prior evidence of shoulder instability with remote Bankart and Hill-Sachs lesions.  Mild early degenerative change of glenohumeral joint.    Assessment:        1. Biceps tendonitis on left    2.  Superior glenoid labrum lesion of left shoulder, initial encounter    3. Incomplete tear of left rotator cuff, unspecified whether traumatic           Plan:          Assessment & Plan  Patient has evidence of significant biceps tendon pathology, partial subluxation from the bicipital groove, and a SLAP lesion. The majority of his clinical symptoms are attributable to his pain. A discussion was held regarding the options of observation, injection, and surgical treatment. The patient expressed a desire to proceed with surgery at this time. The proposed plan for surgery is a left shoulder arthroscopy with biceps tenodesis, subacromial decompression, possible rotator cuff repair, and all associated procedures.      Plan will be for left shoulder arthroscopy, biceps tenodesis, subacromial decompression, possible rotator cuff debridement versus repair and all associated procedures.  I reviewed risks benefits and alternatives the procedure with risks including not limited to neurovascular damage, bleeding, infection, chronic pain, re-tear rotator cuff, failure of healing rotator cuff, loss of motion, weakness, stiffness, instability, biceps sag, DVT, pulmonary embolus, death, stroke, complex regional pain syndrome, myocardial infarction, need for additional procedures. He understood all these had all questions answered.  Patient consented to proceed with surgery.  No guarantees were given regarding results of surgery.      Patient denies history of DVT or pulmonary embolus, denies cardiac history, he is not diabetic    Justen Brooks was in agreement with plan and had all questions answered.

## 2024-08-02 NOTE — ANESTHESIA POSTPROCEDURE EVALUATION
Patient: Justen Brooks    Procedure Summary       Date: 08/02/24 Room / Location:  [facility] OR 2 / [facility]    Anesthesia Start: 1357 Anesthesia Stop: 1620    Procedures:       SHOULDER ARTHROSCOPY WITH ROTATOR CUFF REPAIR (Left: Shoulder)      BICEPS TENDONESIS SUBPECTORALIS MINI OPEN REPAIR (Left: Arm Upper) Diagnosis:       Biceps tendonitis on left      Superior glenoid labrum lesion of left shoulder, initial encounter      Incomplete tear of left rotator cuff, unspecified whether traumatic      (Biceps tendonitis on left [M75.22])      (Superior glenoid labrum lesion of left shoulder, initial encounter [S43.432A])      (Incomplete tear of left rotator cuff, unspecified whether traumatic [M75.112])    Surgeons: Casey Vicente MD Provider: Candi Rose CRNA    Anesthesia Type: general with block ASA Status: 2            Anesthesia Type: general with block    Vitals  Vitals Value Taken Time   /70 08/02/24 1645   Temp 97.6 °F (36.4 °C) 08/02/24 1617   Pulse 80 08/02/24 1646   Resp 16 08/02/24 1645   SpO2 95 % 08/02/24 1647   Vitals shown include unfiled device data.        Post Anesthesia Care and Evaluation    Patient location during evaluation: PHASE II  Patient participation: complete - patient participated  Level of consciousness: awake  Pain management: adequate    Airway patency: patent  Anesthetic complications: No anesthetic complications  PONV Status: none  Cardiovascular status: acceptable  Respiratory status: acceptable  Hydration status: acceptable

## 2024-08-02 NOTE — ANESTHESIA PROCEDURE NOTES
Peripheral Block    Pre-sedation assessment completed: 8/2/2024 12:54 PM    Patient reassessed immediately prior to procedure    Patient location during procedure: pre-op  Start time: 8/2/2024 1:02 PM  Stop time: 8/2/2024 1:10 PM  Performed by  CRNA/CAA: Kady Perdomo CRNA  Assisted by: Candi Rose CRNA  Preanesthetic Checklist  Completed: patient identified, IV checked, site marked, risks and benefits discussed, surgical consent, monitors and equipment checked, pre-op evaluation and timeout performed  Prep:  Pt Position: supine  Sterile barriers:cap, gloves and sterile barriers  Prep: ChloraPrep  Patient monitoring: blood pressure monitoring, continuous pulse oximetry and EKG  Procedure    Sedation: yes  Performed under: local infiltration  Guidance:ultrasound guided and nerve stimulator    ULTRASOUND INTERPRETATION.  Using ultrasound guidance a 21 G gauge needle was placed in close proximity to the brachial plexus nerve, at which point, under ultrasound guidance anesthetic was injected in the area of the nerve and spread of the anesthesia was seen on ultrasound in close proximity thereto.  There were no abnormalities seen on ultrasound; a digital image was taken; and the patient tolerated the procedure with no complications. Images:still images obtained, printed/placed on chart    Laterality:left  Block Type:interscalene  Injection Technique:single-shot  Needle Type:echogenic  Needle Gauge:21 G      Medications Used: bupivacaine PF (MARCAINE) injection 0.5% - Peripheral Nerve   20 mL - 8/2/2024 1:10:00 PM  dexmedetomidine HCl (PRECEDEX) injection - Intravenous   20 mcg - 8/2/2024 1:10:00 PM      Post Assessment  Injection Assessment: negative aspiration for heme, no paresthesia on injection and incremental injection  Patient Tolerance:comfortable throughout block  Complications:no  Performed by: Candi Rose CRNA

## 2024-08-02 NOTE — ANESTHESIA PROCEDURE NOTES
Airway  Urgency: elective    Date/Time: 8/2/2024 2:08 PM  End Time:8/2/2024 2:08 PM  Airway not difficult    General Information and Staff    Patient location during procedure: OR  CRNA/CAA: Candi Rose CRNA    Indications and Patient Condition  Indications for airway management: airway protection    Preoxygenated: yes  Mask difficulty assessment: 1 - vent by mask    Final Airway Details  Final airway type: endotracheal airway      Successful airway: ETT  Cuffed: yes   Successful intubation technique: direct laryngoscopy  Facilitating devices/methods: intubating stylet  Endotracheal tube insertion site: oral  Blade: Frida  Blade size: 4  ETT size (mm): 7.5  Cormack-Lehane Classification: grade I - full view of glottis  Placement verified by: chest auscultation and capnometry   Measured from: lips  ETT/EBT  to lips (cm): 22  Number of attempts at approach: 1  Assessment: lips, teeth, and gum same as pre-op and atraumatic intubation

## 2024-08-02 NOTE — OP NOTE
Date of Operation: 8/2/2024     PREOPERATIVE DIAGNOSIS:  Left shoulder biceps tendonitis  Left shoulder partial thickness rotator cuff tear  Left shoulder subacromial bursitis    POSTOPERATIVE DIAGNOSIS:    Left shoulder biceps tendonitis  Left shoulder partial thickness rotator cuff tear subscapularis  Left shoulder subacromial bursitis  Left shoulder labral fraying    PROCEDURE PERFORMED:   Left shoulder arthroscopic rotator cuff repair of subscapularis  Left shoulder biceps tenodesis     SURGEON: Casey Vicente MD     ASSISTANT:   Assistant: Gage Lyman CSA was responsible for performing the following activities: Retraction, Irrigation, Closing, Placing Dressing, and Held/Positioned Camera and their skilled assistance was necessary for the success of this case.     ANESTHESIA:  general with block       ESTIMATED BLOOD LOSS:  minimal     URINE OUTPUT: Not recorded.       FLUIDS: Per anesthesia.       COMPLICATIONS: None.       SPECIMENS: None.       DRAINS: None.      IMPLANTS: [employer]hew 2.8 mm Q fix anchor x 1, ultra tape x 1 for subscap repair     ARTHROSCOPIC FINDINGS:   1.  Glenoid-grade 3 chondral wear anterior glenoid   2.  Humeral head-grade 2/3 chondral wear superior and posterior humeral head with residual Hill-Sachs deformity  3.  Labrum-moderate degenerative fraying of the labrum with retained suture from prior labral repair  4.  Biceps tendon-significant tearing of the biceps tendon anchor at superior labrum as well as longitudinal tenosynovitis  5.  Rotator cuff-high-grade interstitial partial-thickness subscapularis tear with no evidence of detachment from the lesser tuberosity, no evidence of tearing of supraspinatus, infraspinatus, or teres minor  6.  Axillary pouch-free loose bodies  7.  Subacromial space-moderately thickened subacromial bursa, no evidence of bursal sided tear or significant degenerative change to AC joint     INDICATIONS FOR PROCEDURE: Patient is a pleasant 44 y.o.  male who has had significant limitation and use of left shoulder as well as associated pain with failure of conservative treatments. I discussed treatment options available to the patient and patient wished to proceed with surgical treatment. I explained details of the procedure, as well as the risks, benefits, and alternatives as documented on history and physical, and the patient had all questions answered prior to signing the operative consent form. No guarantees were given in regard to results of the surgery.       DESCRIPTION OF PROCEDURE: The patient was seen, evaluated, and cleared for surgery by anesthesia. Admitted in the preoperative holding area. The operative site was marked, consent was reviewed, history and physical was updated, and preoperative labs were reviewed. A regional block was then placed per anesthesia. The patient was then taken to the operating room and placed in a supine position on a beachchair table. After successful intubation per anesthesia, facemask was placed securing head at this point time with the neck in normal anatomic position.  Patient was then elevated up into a seated position with neck maintained in normal anatomic alignment. All bony prominences were well-padded and patient was secured to the table with a waist strap. The left  upper extremity was then sterilely prepped and draped in a standard fashion.       A formal timeout was completed, including confirmation of History and Physical, operative consent, surgical site, patient identification number, and preoperative antibiotic administration.       Attention was then turned to creation of posterior portal with a 11 blade followed by insertion of blunt trocar and cannula.  Scope was inserted at this point time.  Anterior portal was then made in the rotator interval with spinal needle using outside in technique.  Cannula was then inserted over a trocar and diagnostic arthroscopic exam was carried out at this point in time  with findings as noted above.     Attention was then turned to debridement of glenohumeral joint space including debridement of the superior, anterior, posterior labral fraying with combination of hand-held shaver and ablation wand.       Attention was then turned to release of the biceps tendon. Arthroscopic scissor was inserted through the anterior portal and used to release the biceps tendon at the superior labrum. The superior labrum was then debrided to a smooth stable edge with no residual prominence noted with use of a hand-held shaver as well as ablation wand at this time.    Attention was then turned to repair of the subscapularis high-grade interstitial tear with Acu pass device used to pass ultra tape in cerclage fashion around the tear site.  This was then tied down with a sliding neck knot followed by 4 alternating half inches ensuring good loop and knot security.  Tape was cut short at this point in time.  Repair was noted to be stable under full passive motion with arthroscopic visualization appreciated.     Attention was then turned to open biceps tenodesis.  4 cm longitudinal incision was made centered over the inferior border of the pectoralis major through skin only with 15 blade at this point time.  Once subcutaneous dissection was carried out to the inferior border the pectoralis major was bluntly elevated proximal lateral and retracted at this time.  Biceps tendon was identified at this point time and retrieved with the right angle.  Q fix guide was placed in the bicipital groove of the proximalmost portion identified under direct visualization.  Drill was passed in unicortical fashion followed by insertion of Q fix anchor which was secured into position with good stability on tension across the suture strands.  Biceps tendon was then whipstitched with a running locking stitch, using one suture strand from each pair.  The second strand was passed in simple fashion to the biceps tendon.  The  musculotendinous junction was reapproximated to the inferior border of the pectoralis major.  Excess biceps tendon was resected and tenodesis was then completed with the tendon being tied down with 6 alternating half hitches ensuring good loop and knot security of the repair site.  Sutures were cut short at this point time.  Wound was then thoroughly irrigated with normal saline.      Attention was then returned to the subacromial space where the scope was inserted through the posterior portal, cannula inserted through the anterior portal and subacromial space was evaluated at this point in time.  Debridement of subacromial bursitis was completed with shaver as well as ablation wand at this point time with no evidence of bursal sided rotator cuff tear or laxity in the rotator cuff tendons to suggest intrasubstance tear.      Fluid was evacuated with suction and arthroscopic instruments were removed at this point time.     Attention was then turned to closure the wounds with biceps tenodesis site being closed with 3-0 Vicryl for subcutaneous closure in inverted fashion followed by skin closure with 3-0 Monocryl and Steri-Strips.  Portal sites were closed with 3-0 nylon in interrupted fashion.  Wounds were dressed with Xeroform gauze, 4 x 4, Tegaderm, ABD pad, Medipore tape and patient was placed in a sling with abduction pillow to the left side.     At the end of the procedure, all lap, needle, and sponge counts were correct x2. The patient had brisk capillary refill to all digits of the left upper extremity. Compartments were soft and easily compressible at the end of the procedure.       DISPOSITION: The patient was extubated per anesthesia and taken to the recovery room in stable condition. Will follow up in office in 1 week for wound check. Results discussed immediately after procedure with family and all questions were answered at that time.       REHAB: Standard rotator cuff repair protocol, sling x 4 weeks,  begin physical therapy at week 3.  Avoid external rotation beyond 30 degrees x 6 weeks

## 2024-08-02 NOTE — ANESTHESIA PREPROCEDURE EVALUATION
Anesthesia Evaluation     Patient summary reviewed and Nursing notes reviewed   no history of anesthetic complications:   NPO Solid Status: > 8 hours  NPO Liquid Status: > 2 hours           Airway   Mallampati: II  TM distance: >3 FB  Neck ROM: full  No difficulty expected  Dental      Pulmonary     breath sounds clear to auscultation  (+) ,sleep apnea on CPAP  Cardiovascular   Exercise tolerance: good (4-7 METS)    ECG reviewed  Rhythm: regular  Rate: normal      ROS comment: RR Interval= 750 ms  VT Interval= 160 ms  QRSD Interval= 82 ms  QT Interval= 356 ms  QTc Interval= 411 ms  Heart Rate= 80 ms  P Axis= 22 deg  QRS Axis= 35 deg  T Wave Axis= 38 deg  I: 40 Axis= 5 deg  T: 40 Axis= 44 deg  ST Axis= 48 deg  SINUS RHYTHM  NO PRIOR TRACING AVAILABLE FOR COMPARISON  Electronically Signed by:  Lizet Tobias ([facility]) 06-Sep-2016 09:57:18  Date and Time of Study: 2016-09-05 23:32:45      Neuro/Psych- negative ROS  GI/Hepatic/Renal/Endo    (+) obesity, renal disease- stones    Musculoskeletal         ROS comment: Biceps tendonitis on left  Incomplete tear of left rotator cuff  Superior glenoid labrum lesion of left shoulder      Abdominal   (+) obese   Substance History      OB/GYN          Other   arthritis,                   Anesthesia Plan    ASA 2     general with block     intravenous induction     Anesthetic plan, risks, benefits, and alternatives have been provided, discussed and informed consent has been obtained with: patient.    Use of blood products discussed with patient  Consented to blood products.      CODE STATUS:

## 2024-08-09 ENCOUNTER — OFFICE VISIT (OUTPATIENT)
Dept: ORTHOPEDIC SURGERY | Facility: CLINIC | Age: 44
End: 2024-08-09
Payer: OTHER GOVERNMENT

## 2024-08-09 VITALS — BODY MASS INDEX: 31.97 KG/M2 | HEIGHT: 72 IN | WEIGHT: 236 LBS

## 2024-08-09 DIAGNOSIS — Z98.890 STATUS POST ARTHROSCOPY OF SHOULDER: Primary | ICD-10-CM

## 2024-08-09 RX ORDER — TADALAFIL 5 MG/1
TABLET ORAL
COMMUNITY
Start: 2024-07-01

## 2024-08-09 NOTE — PROGRESS NOTES
CC: F/u s/p left shoulder arthroscopic rotator cuff repair of subscapularis, biceps tenodesis, DOS 8/2/2024     Interval History: Patient returns to clinic stating pain is doing fairly well, has been using sling as instructed, denies any numbness or tingling over left arm. No fevers, chills, or sweats, and no drainage from incisions noted.    Exam:   Left shoulder- incisions clean, dry, sutures in place   Positive sensation all distributions left hand and proximal lateral aspect arm   Cap refill < 3 seconds, radial pulse 2+   Positive deltoid firing   Flex/extend fingers/thumb/wrist with 4+/5 strength, positive thumbs up, okay sign, cross finger adduction and abduction against resistance     Impression: s/p left shoulder arthroscopic rotator cuff repair of the subscapularis, biceps tenodesis     Plan:  1. D/c sutures today and replace with steri-strips- may shower, no submerging wounds x 4 weeks  2. F/u in 3 wks with Dr. Vicente.   3. Will start PT at 3 utilizing standard rotator cuff protocol, avoid external rotation beyond 30 degrees x 6 weeks, sling x 4 weeks. Continue use of sling. Work on finger and wrist ROM. May do gentle elbow ROM 2x/day while out of sling for showering or changing clothes.   4. All questions answered      No orders of the defined types were placed in this encounter.      Orders Placed This Encounter   Procedures    Ambulatory Referral to Physical Therapy     Referral Priority:   Routine     Referral Type:   Physical Therapy     Referral Reason:   Specialty Services Required     Requested Specialty:   Physical Therapy     Number of Visits Requested:   1

## 2024-08-23 ENCOUNTER — HOSPITAL ENCOUNTER (OUTPATIENT)
Dept: PHYSICAL THERAPY | Facility: HOSPITAL | Age: 44
Setting detail: THERAPIES SERIES
Discharge: HOME | End: 2024-08-23
Payer: OTHER GOVERNMENT

## 2024-08-23 DIAGNOSIS — Z98.890 STATUS POST ARTHROSCOPY OF SHOULDER: Primary | ICD-10-CM

## 2024-08-23 PROCEDURE — 97161 PT EVAL LOW COMPLEX 20 MIN: CPT | Performed by: PHYSICAL THERAPIST

## 2024-08-23 NOTE — THERAPY TREATMENT NOTE
Outpatient Physical Therapy Ortho Treatment Note  IKER Moeller     Patient Name: Justen Brooks  : 1980  MRN: [id number]  Today's Date: 2024      Visit Date: 2024    Visit Dx:    ICD-10-CM ICD-9-CM   1. Status post arthroscopy of shoulder  Z98.890 V45.89       Patient Active Problem List   Diagnosis    Acute appendicitis    Biceps tendonitis on left    Superior glenoid labrum lesion of left shoulder    Incomplete tear of left rotator cuff    s/p left shoulder arthroscopic rotator cuff repair of subscapularis, biceps tenodesis, DOS 2024        Past Medical History:   Diagnosis Date    Acromioclavicular separation     shoulder dislocations    Dislocation, shoulder [id number]    about 150 dislocations    Frozen shoulder     Hip arthrosis     diagnosed right hip impingement    Periarthritis of shoulder     post SLAP repair    PONV (postoperative nausea and vomiting)     Sleep apnea     CPAP        Past Surgical History:   Procedure Laterality Date    APPENDECTOMY N/A 2016    Procedure: APPENDECTOMY LAPAROSCOPIC REPAIR OF UMBILICAL HERNIA;  Surgeon: Jose Carlos Lyn MD;  Location:  [facility];  Service:     BICEPS TENDONESIS SUBPECTORALIS REPAIR Left 2024    Procedure: BICEPS TENDONESIS SUBPECTORALIS MINI OPEN REPAIR;  Surgeon: Casey Vicente MD;  Location:  [facility];  Service: Orthopedics;  Laterality: Left;    HERNIA REPAIR      SHOULDER ARTHROSCOPY W/ ROTATOR CUFF REPAIR Left 2024    Procedure: SHOULDER ARTHROSCOPY WITH ROTATOR CUFF REPAIR;  Surgeon: Casey Vicente MD;  Location:  [facility];  Service: Orthopedics;  Laterality: Left;    SHOULDER ARTHROSCOPY W/ SUPERIOR LABRAL ANTERIOR POSTERIOR LESION REPAIR      SHOULDER SURGERY  2011    SLAP repair    VASECTOMY          PT Ortho       Row Name 24 0545       Precautions and Contraindications    Precautions No ER > 30 degrees for 6 weeks  -       Posture/Observations    Posture/Observations  Comments Pt initially seen with left UE in sling with ABD pillow. Scope portals are nicely healed.  -       Left Upper Ext    Lt Shoulder Abduction PROM 124 degrees  -GC    Lt Shoulder Flexion PROM 127 degrees  -GC    Lt Shoulder External Rotation PROM 30 degrees  -GC    Lt Shoulder Internal Rotation PROM 54 degrees  -       MMT (Manual Muscle Testing)    General MMT Comments No MMT completed due to post-op protocol restrictions  -       Sensation    Light Touch No apparent deficits  -              User Key  (r) = Recorded By, (t) = Taken By, (c) = Cosigned By      Initials Name Provider Type     Zachary Mcgee, PT Physical Therapist                                 PT Assessment/Plan       Row Name 08/23/24 7503          PT Assessment    Functional Limitations Limitation in home management;Limitations in community activities;Limitations in functional capacity and performance;Performance in leisure activities;Performance in self-care ADL;Performance in work activities  -     Impairments Range of motion;Pain;Muscle strength  -     Assessment Comments Pt presents 3 weeks s/p arthroscopy of left shoulder for RCR and biceps tenodysis. He has very minimal pain rated as a 1/10. He has the expected decreased ROM and strength with decreased function.  -     Rehab Potential Good  -     Patient/caregiver participated in establishment of treatment plan and goals Yes  -     Patient would benefit from skilled therapy intervention Yes  -        PT Plan    PT Frequency 1x/week;2x/week  -     Predicted Duration of Therapy Intervention (PT) 8 weeks  -     Planned CPT's? PT EVAL LOW COMPLEXITY: 19430;PT THER PROC EA 15 MIN: 21768;PT MANUAL THERAPY EA 15 MIN: 90984;PT HOT OR COLD PACK TREAT MCARE  -     PT Plan Comments Pt is to continue penndulum exercises 2-3x daily  -               User Key  (r) = Recorded By, (t) = Taken By, (c) = Cosigned By      Initials Name Provider Type     Zachary Mcgee, PT  Physical Therapist                     Modalities       Row Name 08/23/24 0545             Moist Heat    MH Applied Yes  -GC      Location left shoulder with pt supine  -      PT Moist Heat Minutes 10  -GC      MH Prior to Rx Yes  -GC                User Key  (r) = Recorded By, (t) = Taken By, (c) = Cosigned By      Initials Name Provider Type    GC Zachary Mcgee, PT Physical Therapist                               Manual Rx (Last 36 Hours)       Manual Treatments       Row Name 08/23/24 0545             Manual Rx 1    Manual Rx 1 Location left shoulder  -      Manual Rx 1 Type Passive stretches in FLEX-ABD-IR  -      Manual Rx 1 Duration 15 min  -                User Key  (r) = Recorded By, (t) = Taken By, (c) = Cosigned By      Initials Name Provider Type    GC Zachary Mcgee, PT Physical Therapist                     PT OP Goals       Row Name 08/23/24 0545          PT Short Term Goals    STG Date to Achieve 09/20/24  -     STG 1 Increase PROM of left shoulder to 150 degrees FLEX and ABD and 75 degrees ER and IR with stretches  -     STG 2 Increase AROM of left shoulder to 130 degrees FLEX and ABD and 45 degrees ER and IR with testing.  -     STG 3 Increase left shoulder girdle strength to at least 4/5 all planes with testing.  -     STG 4 Pt will be independent with his HEP issued by this therapist.  -        Long Term Goals    LTG Date to Achieve 10/18/24  -     LTG 1 Decrease left shoulder pain to 0/10 with activity.  -     LTG 2 Increase PROM of left shoulder to 170 degrees FLEX and ABD and 85 degrees ER and IR with stretches  -     LTG 3 Increase AROM of left shoulder to 165 degrees FLEX and ABD and 75 degrees ER and IR with testing.  -     LTG 4 Increase left shoulder girdle strength to 5/5 all planes with testing.  -     LTG 5 Pt will be independent with all ADLs and have a Quick Dash score < 15.  -        Time Calculation    PT Goal Re-Cert Due Date 09/20/24  -                User Key  (r) = Recorded By, (t) = Taken By, (c) = Cosigned By      Initials Name Provider Type    Zachary Garcia PT Physical Therapist                    Therapy Education  Given: HEP, Symptoms/condition management, Pain management  Program: New  How Provided: Verbal, Demonstration  Provided to: Patient  Level of Understanding: Teach back education performed, Verbalized, Demonstrated    Outcome Measure Options: Quick DASH  Quick DASH  Open a tight or new jar.: Mild Difficulty  Do heavy household chores (e.g., wash walls, wash floors): No Difficulty  Carry a shopping bag or briefcase: Moderate Difficulty  Wash your back: Unable  Use a knife to cut food: Mild Difficulty  Recreational activities in which you take some force or impact through your arm, should or hand (e.g. golf, hammering, tennis, etc.): Moderate Difficulty  During the past week, to what extent has your arm, shoulder, or hand problem interfered with your normal social activites with family, friends, neighbors or groups?: Quite a bit  During the past week, were you limited in your work or other regular daily activities as a result of your arm, shoulder or hand problem?: Very limited  Arm, Shoulder, or hand pain: None  Tingling (pins and needles) in your arm, shoulder, or hand: Mild  During the past week, how much difficulty have you had sleeping because of the pain in your arm, shoulder or hand?: Mild Difficulty  Number of Questions Answered: 11  Quick DASH Score: 40.91  Work Module (Optional)  Using your usual technique for your work?: Severe Difficulty  Doing your usual work because of arm, shoulder or hand pain?: No Difficulty  Doing your work as well as you would like?: Severe Difficulty  Spending your usual amount of time doing your work?: Severe Difficulty  Work Module Score: 56.25         Time Calculation:   Start Time: 0545  Stop Time: 0630  Time Calculation (min): 45 min  Untimed Charges  PT Moist Heat Minutes: 10  Total  Minutes  Untimed Charges Total Minutes: 10   Total Minutes: 10  Therapy Charges for Today       Code Description Service Date Service Provider Modifiers Qty    03680771294 HC PT EVAL LOW COMPLEXITY 3 8/23/2024 Zachary Mcgee, PT GP 1            PT G-Codes  Outcome Measure Options: Quick DASH  Quick DASH Score: 40.91         Zachary Mcgee, PT  8/23/2024

## 2024-08-28 ENCOUNTER — OFFICE VISIT (OUTPATIENT)
Dept: ORTHOPEDIC SURGERY | Facility: CLINIC | Age: 44
End: 2024-08-28
Payer: OTHER GOVERNMENT

## 2024-08-28 ENCOUNTER — HOSPITAL ENCOUNTER (OUTPATIENT)
Dept: PHYSICAL THERAPY | Facility: HOSPITAL | Age: 44
Setting detail: THERAPIES SERIES
Discharge: HOME | End: 2024-08-28
Payer: OTHER GOVERNMENT

## 2024-08-28 VITALS — HEIGHT: 72 IN | BODY MASS INDEX: 31.97 KG/M2 | WEIGHT: 236 LBS

## 2024-08-28 DIAGNOSIS — Z98.890 STATUS POST ARTHROSCOPY OF SHOULDER: Primary | ICD-10-CM

## 2024-08-28 PROCEDURE — 97140 MANUAL THERAPY 1/> REGIONS: CPT | Performed by: PHYSICAL THERAPIST

## 2024-08-28 NOTE — THERAPY TREATMENT NOTE
Outpatient Physical Therapy Ortho Treatment Note  IKER Moeller     Patient Name: Justen Brooks  : 1980  MRN: [id number]  Today's Date: 2024      Visit Date: 2024    Visit Dx:    ICD-10-CM ICD-9-CM   1. Status post arthroscopy of shoulder  Z98.890 V45.89       Patient Active Problem List   Diagnosis    Acute appendicitis    Biceps tendonitis on left    Superior glenoid labrum lesion of left shoulder    Incomplete tear of left rotator cuff    s/p left shoulder arthroscopic rotator cuff repair of subscapularis, biceps tenodesis, DOS 2024        Past Medical History:   Diagnosis Date    Acromioclavicular separation     shoulder dislocations    Dislocation, shoulder 1208-1950    about 150 dislocations    Frozen shoulder     Hip arthrosis     diagnosed right hip impingement    Periarthritis of shoulder     post SLAP repair    PONV (postoperative nausea and vomiting)     Sleep apnea     CPAP        Past Surgical History:   Procedure Laterality Date    APPENDECTOMY N/A 2016    Procedure: APPENDECTOMY LAPAROSCOPIC REPAIR OF UMBILICAL HERNIA;  Surgeon: Jose Carlos Lyn MD;  Location:  [facility];  Service:     BICEPS TENDONESIS SUBPECTORALIS REPAIR Left 2024    Procedure: BICEPS TENDONESIS SUBPECTORALIS MINI OPEN REPAIR;  Surgeon: Casey Vicente MD;  Location:  [facility];  Service: Orthopedics;  Laterality: Left;    HERNIA REPAIR      SHOULDER ARTHROSCOPY W/ ROTATOR CUFF REPAIR Left 2024    Procedure: SHOULDER ARTHROSCOPY WITH ROTATOR CUFF REPAIR;  Surgeon: Casey Vicente MD;  Location:  [facility];  Service: Orthopedics;  Laterality: Left;    SHOULDER ARTHROSCOPY W/ SUPERIOR LABRAL ANTERIOR POSTERIOR LESION REPAIR      SHOULDER SURGERY  2011    SLAP repair    VASECTOMY                          PT Assessment/Plan       Row Name 24 1330          PT Assessment    Assessment Comments Pt is doing well with excellent PROM at thsi stage of his recovery.  -GC         PT Plan    PT Plan Comments Pt has MD follow up this afternoon. Will continue as advised.  -GC               User Key  (r) = Recorded By, (t) = Taken By, (c) = Cosigned By      Initials Name Provider Type     Zachary Mcgee, MARSHAL Physical Therapist                     Modalities       Row Name 08/28/24 1330             Subjective    Subjective Comments Pt states his shoulder is feeling pretty good.  -GC         Moist Heat    MH Applied Yes  -GC      Location left shoulder with pt supine  -GC      PT Moist Heat Minutes 10  -GC      MH Prior to Rx Yes  -GC         Functional Testing    Outcome Measure Options Quick DASH  -GC                User Key  (r) = Recorded By, (t) = Taken By, (c) = Cosigned By      Initials Name Provider Type     Zachary Mcgee, PT Physical Therapist                   OP Exercises       Row Name 08/28/24 1330             Subjective    Subjective Comments Pt states his shoulder is feeling pretty good.  -GC         Exercise 1    Exercise Name 1 Pulley-FLEX  -GC      Reps 1 3 min  -GC         Exercise 2    Exercise Name 2 Pulley-Scaption  -GC      Reps 2 3 min  -GC                User Key  (r) = Recorded By, (t) = Taken By, (c) = Cosigned By      Initials Name Provider Type     Zachary Mcgee, PT Physical Therapist                             Manual Rx (Last 36 Hours)       Manual Treatments       Row Name 08/28/24 1330             Manual Rx 1    Manual Rx 1 Location left shoulder  -GC      Manual Rx 1 Type Passive stretches in FLEX-ABD-IR  -GC      Manual Rx 1 Duration 15 min  -GC                User Key  (r) = Recorded By, (t) = Taken By, (c) = Cosigned By      Initials Name Provider Type     Zachary Mcgee, PT Physical Therapist                             Outcome Measure Options: Quick DASH         Time Calculation:   Start Time: 1330  Stop Time: 1405  Time Calculation (min): 35 min  Untimed Charges  PT Moist Heat Minutes: 10  Total Minutes  Untimed Charges Total Minutes: 10   Total  Minutes: 10  Therapy Charges for Today       Code Description Service Date Service Provider Modifiers Qty    54576293344 HC PT MANUAL THERAPY EA 15 MIN 8/28/2024 Zachary Mcgee, PT GP 1            PT G-Codes  Outcome Measure Options: Quick DASH         Zachary Mcgee, PT  8/28/2024

## 2024-08-28 NOTE — PROGRESS NOTES
CC: F/u s/p left shoulder rotator cuff repair of subscapularis and biceps tenodesis  DOS 8/2/2024    Interval History: Patient returns to clinic stating pain is doing fairly well, has been using sling as instructed, denies any numbness or tingling over left arm. No fevers, chills, or sweats, and no drainage from incisions noted. He has started into physical therapy.    Exam:   Left shoulder- incisions healing well   Tolerates FF- 160,   ER- 20   Positive sensation all distributions left hand and proximal lateral aspect arm, positive deltoid firing   Cap refill < 3 seconds, radial pulse 2+   Positive deltoid firing   Flex/extend fingers/thumb/wrist with 4+/5 strength, positive thumbs up, okay sign, cross finger adduction and abduction against resistance     Rehab: Standard rotator cuff repair protocol, sling x 4 weeks, begin physical therapy at week 3    Impression: s/p left shoulder rotator cuff repair of subscapularis and biceps tenodesis     Plan:  1. Continue PT for work on ROM and progressing into strengthening per protocol  2. Discontinue sling  3. Instructed on passive ROM exercises to be done multiple times daily at home  4. F/u in 4 weeks to evaluate motion and progress with PT  5. All questions answered      No orders of the defined types were placed in this encounter.      No orders of the defined types were placed in this encounter.

## 2024-09-04 ENCOUNTER — HOSPITAL ENCOUNTER (OUTPATIENT)
Dept: PHYSICAL THERAPY | Facility: HOSPITAL | Age: 44
Setting detail: THERAPIES SERIES
Discharge: HOME | End: 2024-09-04
Payer: OTHER GOVERNMENT

## 2024-09-04 DIAGNOSIS — Z98.890 STATUS POST ARTHROSCOPY OF SHOULDER: Primary | ICD-10-CM

## 2024-09-04 PROCEDURE — 97140 MANUAL THERAPY 1/> REGIONS: CPT | Performed by: PHYSICAL THERAPIST

## 2024-09-04 RX ORDER — DOXYCYCLINE 100 MG/1
100 CAPSULE ORAL 2 TIMES DAILY
Qty: 14 CAPSULE | Refills: 0 | Status: SHIPPED | OUTPATIENT
Start: 2024-09-04 | End: 2024-09-11

## 2024-09-04 NOTE — THERAPY TREATMENT NOTE
Outpatient Physical Therapy Ortho Treatment Note  IKER Moeller     Patient Name: Justen Brooks  : 1980  MRN: [id number]  Today's Date: 2024      Visit Date: 2024    Visit Dx:    ICD-10-CM ICD-9-CM   1. Status post arthroscopy of shoulder  Z98.890 V45.89       Patient Active Problem List   Diagnosis    Acute appendicitis    Biceps tendonitis on left    Superior glenoid labrum lesion of left shoulder    Incomplete tear of left rotator cuff    s/p left shoulder arthroscopic rotator cuff repair of subscapularis, biceps tenodesis, DOS 2024        Past Medical History:   Diagnosis Date    Acromioclavicular separation     shoulder dislocations    Dislocation, shoulder 8246-7451    about 150 dislocations    Frozen shoulder     Hip arthrosis     diagnosed right hip impingement    Periarthritis of shoulder     post SLAP repair    PONV (postoperative nausea and vomiting)     Sleep apnea     CPAP        Past Surgical History:   Procedure Laterality Date    APPENDECTOMY N/A 2016    Procedure: APPENDECTOMY LAPAROSCOPIC REPAIR OF UMBILICAL HERNIA;  Surgeon: Jose Carlos Lyn MD;  Location:  [facility];  Service:     BICEPS TENDONESIS SUBPECTORALIS REPAIR Left 2024    Procedure: BICEPS TENDONESIS SUBPECTORALIS MINI OPEN REPAIR;  Surgeon: Casey Vicente MD;  Location:  [facility];  Service: Orthopedics;  Laterality: Left;    HERNIA REPAIR      SHOULDER ARTHROSCOPY W/ ROTATOR CUFF REPAIR Left 2024    Procedure: SHOULDER ARTHROSCOPY WITH ROTATOR CUFF REPAIR;  Surgeon: Casey Vicente MD;  Location:  [facility];  Service: Orthopedics;  Laterality: Left;    SHOULDER ARTHROSCOPY W/ SUPERIOR LABRAL ANTERIOR POSTERIOR LESION REPAIR      SHOULDER SURGERY  2011    SLAP repair    VASECTOMY                          PT Assessment/Plan       Row Name 24 0530          PT Assessment    Assessment Comments Pt is doing very well with PROM in FLEX-ABD-IR WFL. His ER is still at 30  degrees per MD instruction of limiting ER to 30 degrees for 6 weeks.  -GC        PT Plan    PT Plan Comments Will begin gentle strengthening next week as pt will be 6 weeks post-op.  -GC               User Key  (r) = Recorded By, (t) = Taken By, (c) = Cosigned By      Initials Name Provider Type    GC Zachary Mcgee, PT Physical Therapist                     Modalities       Row Name 09/04/24 0530             Moist Heat    MH Applied Yes  -GC      Location left shoulder with pt supine  -GC      PT Moist Heat Minutes 10  -GC      MH Prior to Rx Yes  -GC         Functional Testing    Outcome Measure Options Quick DASH  -                User Key  (r) = Recorded By, (t) = Taken By, (c) = Cosigned By      Initials Name Provider Type     Zachary Mcgee, PT Physical Therapist                               Manual Rx (Last 36 Hours)       Manual Treatments       Row Name 09/04/24 0530             Manual Rx 1    Manual Rx 1 Location left shoulder  -      Manual Rx 1 Type Passive stretches in FLEX-ABD-IR  -GC      Manual Rx 1 Duration 15 min  -GC                User Key  (r) = Recorded By, (t) = Taken By, (c) = Cosigned By      Initials Name Provider Type     Zachary Mcgee, PT Physical Therapist                             Outcome Measure Options: Quick DASH         Time Calculation:   Start Time: 0530  Stop Time: 0600  Time Calculation (min): 30 min  Untimed Charges  PT Moist Heat Minutes: 10  Total Minutes  Untimed Charges Total Minutes: 10   Total Minutes: 10  Therapy Charges for Today       Code Description Service Date Service Provider Modifiers Qty    20881540000 HC PT MANUAL THERAPY EA 15 MIN 9/4/2024 Zachary Mcgee, PT GP 1            PT G-Codes  Outcome Measure Options: Quick DASH         Zachary Mcgee PT  9/4/2024

## 2024-09-11 ENCOUNTER — HOSPITAL ENCOUNTER (OUTPATIENT)
Dept: PHYSICAL THERAPY | Facility: HOSPITAL | Age: 44
Setting detail: THERAPIES SERIES
Discharge: HOME | End: 2024-09-11
Payer: OTHER GOVERNMENT

## 2024-09-11 DIAGNOSIS — Z98.890 STATUS POST ARTHROSCOPY OF SHOULDER: Primary | ICD-10-CM

## 2024-09-11 PROCEDURE — 97140 MANUAL THERAPY 1/> REGIONS: CPT | Performed by: PHYSICAL THERAPIST

## 2024-09-11 NOTE — THERAPY TREATMENT NOTE
Outpatient Physical Therapy Ortho Treatment Note  IKER Moeller     Patient Name: Justen Brooks  : 1980  MRN: [id number]  Today's Date: 2024      Visit Date: 2024    Visit Dx:    ICD-10-CM ICD-9-CM   1. Status post arthroscopy of shoulder  Z98.890 V45.89       Patient Active Problem List   Diagnosis    Acute appendicitis    Biceps tendonitis on left    Superior glenoid labrum lesion of left shoulder    Incomplete tear of left rotator cuff    s/p left shoulder arthroscopic rotator cuff repair of subscapularis, biceps tenodesis, DOS 2024        Past Medical History:   Diagnosis Date    Acromioclavicular separation     shoulder dislocations    Dislocation, shoulder 8991-4425    about 150 dislocations    Frozen shoulder     Hip arthrosis     diagnosed right hip impingement    Periarthritis of shoulder     post SLAP repair    PONV (postoperative nausea and vomiting)     Sleep apnea     CPAP        Past Surgical History:   Procedure Laterality Date    APPENDECTOMY N/A 2016    Procedure: APPENDECTOMY LAPAROSCOPIC REPAIR OF UMBILICAL HERNIA;  Surgeon: Jose Carlos Lyn MD;  Location:  [facility];  Service:     BICEPS TENDONESIS SUBPECTORALIS REPAIR Left 2024    Procedure: BICEPS TENDONESIS SUBPECTORALIS MINI OPEN REPAIR;  Surgeon: Casey Vicente MD;  Location:  [facility];  Service: Orthopedics;  Laterality: Left;    HERNIA REPAIR      SHOULDER ARTHROSCOPY W/ ROTATOR CUFF REPAIR Left 2024    Procedure: SHOULDER ARTHROSCOPY WITH ROTATOR CUFF REPAIR;  Surgeon: Casey Vicente MD;  Location:  [facility];  Service: Orthopedics;  Laterality: Left;    SHOULDER ARTHROSCOPY W/ SUPERIOR LABRAL ANTERIOR POSTERIOR LESION REPAIR      SHOULDER SURGERY  2011    SLAP repair    VASECTOMY          PT Ortho       Row Name 24 0530       Left Upper Ext    Lt Shoulder Abduction AROM 172 degrees  -GC    Lt Shoulder Flexion AROM 168 degrees  -GC    Lt Shoulder External Rotation  AROM 64 degrees  -GC    Lt Shoulder Internal Rotation AROM 77 degrees  -GC              User Key  (r) = Recorded By, (t) = Taken By, (c) = Cosigned By      Initials Name Provider Type    Zachary Garcia PT Physical Therapist                                 PT Assessment/Plan       Row Name 09/11/24 0530          PT Assessment    Assessment Comments Pt is doing very well with increased shoulde range and good tolerance to his strengthening exercises.  -GC        PT Plan    PT Plan Comments Pt is to continue his HEP 2x daily. Will re-ck again next week.  -GC               User Key  (r) = Recorded By, (t) = Taken By, (c) = Cosigned By      Initials Name Provider Type     Zachary Mcgee PT Physical Therapist                       OP Exercises       Row Name 09/11/24 0530             Subjective    Subjective Comments Pt states his shoulder is feeling pretty good.  -GC         Exercise 1    Exercise Name 1 sidelying shoulder ER  -GC      Reps 1 25  -GC      Time 1 2#  -GC         Exercise 2    Exercise Name 2 scaption up  -GC      Reps 2 25  -GC      Time 2 2#  -GC         Exercise 3    Exercise Name 3 scaption down  -GC      Reps 3 25  -GC      Time 3 2#  -GC         Exercise 4    Exercise Name 4 shoulder ER vs theraband  -GC      Reps 4 25  -GC      Time 4 black  -GC         Exercise 5    Exercise Name 5 shoulder IR vs theraband  -GC      Reps 5 25  -GC      Time 5 black  -GC         Exercise 6    Exercise Name 6 shoulder EXT vs theraband  -GC      Reps 6 25  -GC      Time 6 black  -GC         Exercise 7    Exercise Name 7 shoulder Rows vs theraband  -GC      Reps 7 25  -GC      Time 7 black  -GC                User Key  (r) = Recorded By, (t) = Taken By, (c) = Cosigned By      Initials Name Provider Type     Zachary Mcgee PT Physical Therapist                             Manual Rx (Last 36 Hours)       Manual Treatments       Row Name 09/11/24 0530             Manual Rx 1    Manual Rx 1 Location left shoulder   -GC      Manual Rx 1 Type Passive stretches in FLEX-ABD-IR  -GC      Manual Rx 1 Duration 15 min  -                User Key  (r) = Recorded By, (t) = Taken By, (c) = Cosigned By      Initials Name Provider Type     Zachary Mcgee, PT Physical Therapist                                       Time Calculation:   Start Time: 0530  Stop Time: 0602  Time Calculation (min): 32 min  Therapy Charges for Today       Code Description Service Date Service Provider Modifiers Qty    84662005976  PT MANUAL THERAPY EA 15 MIN 9/11/2024 Zachary Mcgee, PT GP 1                      Zachary Mcgee, PT  9/11/2024

## 2024-09-18 ENCOUNTER — HOSPITAL ENCOUNTER (OUTPATIENT)
Dept: PHYSICAL THERAPY | Facility: HOSPITAL | Age: 44
Setting detail: THERAPIES SERIES
Discharge: HOME | End: 2024-09-18
Payer: OTHER GOVERNMENT

## 2024-09-18 DIAGNOSIS — Z98.890 STATUS POST ARTHROSCOPY OF SHOULDER: Primary | ICD-10-CM

## 2024-09-18 PROCEDURE — 97110 THERAPEUTIC EXERCISES: CPT | Performed by: PHYSICAL THERAPIST

## 2024-09-25 ENCOUNTER — OFFICE VISIT (OUTPATIENT)
Dept: ORTHOPEDIC SURGERY | Facility: CLINIC | Age: 44
End: 2024-09-25
Payer: OTHER GOVERNMENT

## 2024-09-25 VITALS — HEIGHT: 72 IN | WEIGHT: 236 LBS | BODY MASS INDEX: 31.97 KG/M2

## 2024-09-25 DIAGNOSIS — Z98.890 STATUS POST ARTHROSCOPY OF SHOULDER: Primary | ICD-10-CM

## 2024-09-25 PROCEDURE — 99024 POSTOP FOLLOW-UP VISIT: CPT | Performed by: ORTHOPAEDIC SURGERY

## 2024-12-13 ENCOUNTER — TELEPHONE (OUTPATIENT)
Dept: ORTHOPEDIC SURGERY | Facility: CLINIC | Age: 44
End: 2024-12-13

## 2024-12-13 NOTE — TELEPHONE ENCOUNTER
CALLED PATIENT TO LET HIM KNOW THAT HIS [facility] AUTH HAS . ASKED HIM TO CALL THE [facility] SO THEY COULD FAX OVER A NEW [facility] AUTH. EXPLAINED THAT IF WE DO NOT GET AUTH BY THE END OF THE DAY, THAT WE WILL NEED TO CANCEL HIS APPOINTMENT FOR MONDAY.

## 2024-12-23 ENCOUNTER — TELEPHONE (OUTPATIENT)
Dept: ORTHOPEDIC SURGERY | Facility: CLINIC | Age: 44
End: 2024-12-23
Payer: OTHER GOVERNMENT

## 2024-12-23 NOTE — TELEPHONE ENCOUNTER
SPOKE WITH [facility] OFFICE TO LET THEM KNOW WE RECEIVED AN APPROVAL LETTER BUT IT DID NOT STATE HOW MANY DAYS. SHE STATED THAT WE GET THE APPROVAL LETTER TO LET US KNOW IT IS APPROVED, BUT ANOTHER LETTER WILL COME STATING HOW MANY.  I TOLD HER HE HAS AN UPCOMING APPT IN [place] AND SHE ADDED THAT TO HIS REFERRAL SO IT WILL GET WORKED UP

## 2025-01-13 ENCOUNTER — OFFICE VISIT (OUTPATIENT)
Dept: ORTHOPEDIC SURGERY | Facility: CLINIC | Age: 45
End: 2025-01-13
Payer: OTHER GOVERNMENT

## 2025-01-13 VITALS — HEIGHT: 72 IN | BODY MASS INDEX: 32.01 KG/M2

## 2025-01-13 DIAGNOSIS — M75.52 SUBACROMIAL BURSITIS OF LEFT SHOULDER JOINT: ICD-10-CM

## 2025-01-13 DIAGNOSIS — Z98.890 STATUS POST ARTHROSCOPY OF SHOULDER: Primary | ICD-10-CM

## 2025-01-13 PROCEDURE — 20610 DRAIN/INJ JOINT/BURSA W/O US: CPT | Performed by: ORTHOPAEDIC SURGERY

## 2025-01-13 PROCEDURE — 99213 OFFICE O/P EST LOW 20 MIN: CPT | Performed by: ORTHOPAEDIC SURGERY

## 2025-01-13 RX ORDER — TRIAMCINOLONE ACETONIDE 40 MG/ML
80 INJECTION, SUSPENSION INTRA-ARTICULAR; INTRAMUSCULAR
Status: COMPLETED | OUTPATIENT
Start: 2025-01-13 | End: 2025-01-13

## 2025-01-13 RX ORDER — LIDOCAINE HYDROCHLORIDE 10 MG/ML
4 INJECTION, SOLUTION EPIDURAL; INFILTRATION; INTRACAUDAL; PERINEURAL
Status: COMPLETED | OUTPATIENT
Start: 2025-01-13 | End: 2025-01-13

## 2025-01-13 RX ADMIN — TRIAMCINOLONE ACETONIDE 80 MG: 40 INJECTION, SUSPENSION INTRA-ARTICULAR; INTRAMUSCULAR at 14:39

## 2025-01-13 RX ADMIN — LIDOCAINE HYDROCHLORIDE 4 ML: 10 INJECTION, SOLUTION EPIDURAL; INFILTRATION; INTRACAUDAL; PERINEURAL at 14:39

## 2025-01-13 NOTE — PROGRESS NOTES
Subjective:     Patient ID: Justen Brooks is a 44 y.o. male.    Chief Complaint:  F/u s/p left shoulder rotator cuff repair of subscapularis and biceps tenodesis DOS 8/2/2024   History of Present Illness  History of Present Illness  The patient returns to the clinic today for a follow-up evaluation regarding his left shoulder.    Overall, he is doing fairly well at this point in time, but he still is noting some pain, particularly when he is going into maximal abduction with resistance as well as with maximal external rotation or resisted adduction. The pain is localized to the anterolateral aspect of his left shoulder, primarily up into the subacromial space, and occasionally radiates down into his anterior arm to the mid humerus level. He reports no numbness, tingling, fever, chills, or sweats. Despite his pain and limitations, he has had good functional improvement in motion and strength.     Social History     Occupational History    Not on file   Tobacco Use    Smoking status: Never     Passive exposure: Never    Smokeless tobacco: Never   Vaping Use    Vaping status: Never Used   Substance and Sexual Activity    Alcohol use: No    Drug use: Never    Sexual activity: Yes     Partners: Female     Birth control/protection: Vasectomy, Hysterectomy      Past Medical History:   Diagnosis Date    Acromioclavicular separation 2005    shoulder dislocations    Dislocation, shoulder [id number]    about 150 dislocations    Frozen shoulder     Hip arthrosis 2014    diagnosed right hip impingement    Periarthritis of shoulder 2011    post SLAP repair    PONV (postoperative nausea and vomiting)     Sleep apnea     CPAP     Past Surgical History:   Procedure Laterality Date    APPENDECTOMY N/A 09/06/2016    Procedure: APPENDECTOMY LAPAROSCOPIC REPAIR OF UMBILICAL HERNIA;  Surgeon: Jose Carlos Lyn MD;  Location: [facility];  Service:     BICEPS TENDONESIS SUBPECTORALIS REPAIR Left 8/2/2024    Procedure: BICEPS TENDONESIS  "SUBPECTORALIS MINI OPEN REPAIR;  Surgeon: Casye Vicente MD;  Location:  [facility];  Service: Orthopedics;  Laterality: Left;    HERNIA REPAIR      SHOULDER ARTHROSCOPY W/ ROTATOR CUFF REPAIR Left 8/2/2024    Procedure: SHOULDER ARTHROSCOPY WITH ROTATOR CUFF REPAIR;  Surgeon: Casey Vicente MD;  Location:  [facility];  Service: Orthopedics;  Laterality: Left;    SHOULDER ARTHROSCOPY W/ SUPERIOR LABRAL ANTERIOR POSTERIOR LESION REPAIR      SHOULDER SURGERY  July 2011    SLAP repair    VASECTOMY         History reviewed. No pertinent family history.      Review of Systems        Objective:  Vitals:    01/13/25 1403   Height: 182.9 cm (72\")     There were no vitals filed for this visit.  Body mass index is 32.01 kg/m².  General: No acute distress.  Resp: normal respiratory effort  Skin: no rashes or wounds; normal turgor  Psych: mood and affect appropriate; recent and remote memory intact        Physical Exam  The patient's left shoulder shows active forward flexion at 175 degrees with 4+ out of 5 strength. External rotation is 55 degrees with 5 out of 5 strength. Internal rotation reaches T10 with 5 out of 5 strength on belly press test. Negative bear hug sign. Positive Guadalupe and Neer's tests. Mildly positive empty can test. Negative drop arm test. Negative external rotation lag sign. Brisk capillary refill in all digits. Radial pulse is 2+ in the left wrist. Positive sensation to light touch in all distributions in the left hand and proximal arm, symmetric to the right.         Imaging:  Three-view x-rays left shoulder from today's visit AP, Scap Y, axillary lateral views, ordered and reviewed by me, indication status post subscap repair and biceps tenodesis, compared to prior office x-rays indicates well centered humeral head with no evidence of significant humeral head elevation, no evidence of significant anterior subluxation.  Mild glenohumeral joint space narrowing.  Assessment:        1. Status post " arthroscopy of shoulder           Plan:    - Large Joint Arthrocentesis: L subacromial bursa on 1/13/2025 2:39 PM  Indications: pain  Details: 22 G needle, lateral approach  Medications: 80 mg triamcinolone acetonide 40 MG/ML; 4 mL lidocaine PF 1% 1 %  Outcome: tolerated well, no immediate complications  Procedure, treatment alternatives, risks and benefits explained, specific risks discussed. Consent was given by the patient. Immediately prior to procedure a time out was called to verify the correct patient, procedure, equipment, support staff and site/side marked as required. Patient was prepped and draped in the usual sterile fashion.                 Assessment & Plan  1. Left shoulder pain.  He appears to have residual subacromial bursitis that has not yet resolved. Functionally, his shoulder is moving well, and the x-ray shows good centering of the humeral head. Treatment options were discussed at length. He opted for a subacromial injection today. He will continue working on home exercises for strength and motion as tolerated. If there is no good response to the subacromial injection, a glenohumeral injection may be considered due to evidence of degenerative change in the glenohumeral joint observed during the scope. He was in agreement and had all questions answered.    Patient would like to proceed with cortisone injection today to the left shoulder subacromial space. Recommended limited use of affected extremity for the next 24 hours to only essential activites other than work on general active and passive motion. Recommended supplementing with ice and soft tissue massage. Discussed with patient that they should see results in 5-7 days, if no improvement in 5-6 weeks I have asked them to call the office to review other options. Patient should call office immediately if they notice redness, warmth, fevers, chills, or residual numbness or tingling for greater than 6 hours after injection.       Follow-up  The  patient will follow up as needed.    Justen Reid Brooks was in agreement with plan and had all questions answered.     Orders:  Orders Placed This Encounter   Procedures    XR Shoulder 2+ View Left       Medications:  No orders of the defined types were placed in this encounter.      Followup:  No follow-ups on file.    Diagnoses and all orders for this visit:    1. Status post arthroscopy of shoulder (Primary)  -     XR Shoulder 2+ View Left                  Dictated utilizing Dragon dictation     Patient or patient representative verbalized consent for the use of Ambient Listening during the visit with  Casey Vicente MD for chart documentation. 1/13/2025  14:23 EST

## 2025-03-05 ENCOUNTER — TRANSCRIBE ORDERS (OUTPATIENT)
Dept: ADMINISTRATIVE | Facility: HOSPITAL | Age: 45
End: 2025-03-05
Payer: OTHER GOVERNMENT

## 2025-03-05 ENCOUNTER — LAB (OUTPATIENT)
Dept: LAB | Facility: HOSPITAL | Age: 45
End: 2025-03-05
Payer: OTHER GOVERNMENT

## 2025-03-05 DIAGNOSIS — E34.9 INSULIN RECEPTOR DEFECT: ICD-10-CM

## 2025-03-05 DIAGNOSIS — E34.9 INSULIN RECEPTOR DEFECT: Primary | ICD-10-CM

## 2025-03-05 LAB — TESTOST SERPL-MCNC: 248 NG/DL (ref 249–836)

## 2025-03-05 PROCEDURE — 36415 COLL VENOUS BLD VENIPUNCTURE: CPT

## 2025-03-05 PROCEDURE — 84403 ASSAY OF TOTAL TESTOSTERONE: CPT

## 2025-08-15 ENCOUNTER — ANESTHESIA (OUTPATIENT)
Dept: PERIOP | Facility: HOSPITAL | Age: 45
End: 2025-08-15
Payer: OTHER GOVERNMENT

## 2025-08-15 ENCOUNTER — HOSPITAL ENCOUNTER (OUTPATIENT)
Facility: HOSPITAL | Age: 45
Setting detail: OBSERVATION
Discharge: HOME | End: 2025-08-15
Attending: STUDENT IN AN ORGANIZED HEALTH CARE EDUCATION/TRAINING PROGRAM | Admitting: STUDENT IN AN ORGANIZED HEALTH CARE EDUCATION/TRAINING PROGRAM
Payer: OTHER GOVERNMENT

## 2025-08-15 ENCOUNTER — APPOINTMENT (OUTPATIENT)
Dept: CT IMAGING | Facility: HOSPITAL | Age: 45
End: 2025-08-15
Payer: OTHER GOVERNMENT

## 2025-08-15 ENCOUNTER — ANESTHESIA EVENT (OUTPATIENT)
Dept: PERIOP | Facility: HOSPITAL | Age: 45
End: 2025-08-15
Payer: OTHER GOVERNMENT

## 2025-08-15 ENCOUNTER — APPOINTMENT (OUTPATIENT)
Dept: GENERAL RADIOLOGY | Facility: HOSPITAL | Age: 45
End: 2025-08-15
Payer: OTHER GOVERNMENT

## 2025-08-15 VITALS
TEMPERATURE: 97.9 F | BODY MASS INDEX: 34.44 KG/M2 | HEIGHT: 71 IN | HEART RATE: 55 BPM | RESPIRATION RATE: 18 BRPM | OXYGEN SATURATION: 96 % | DIASTOLIC BLOOD PRESSURE: 71 MMHG | SYSTOLIC BLOOD PRESSURE: 128 MMHG | WEIGHT: 246 LBS

## 2025-08-15 DIAGNOSIS — R82.71 BACTERIURIA: ICD-10-CM

## 2025-08-15 DIAGNOSIS — N20.1 URETEROLITHIASIS: Primary | ICD-10-CM

## 2025-08-15 LAB
ABO GROUP BLD: NORMAL
ALBUMIN SERPL-MCNC: 4.4 G/DL (ref 3.5–5.2)
ALBUMIN/GLOB SERPL: 1.6 G/DL
ALP SERPL-CCNC: 89 U/L (ref 39–117)
ALT SERPL W P-5'-P-CCNC: 62 U/L (ref 1–41)
AMORPH URATE CRY URNS QL MICRO: ABNORMAL /HPF
AMORPH URATE CRY URNS QL MICRO: ABNORMAL /HPF
ANION GAP SERPL CALCULATED.3IONS-SCNC: 9.8 MMOL/L (ref 5–15)
APTT PPP: 24.7 SECONDS (ref 24.3–38.1)
AST SERPL-CCNC: 46 U/L (ref 1–40)
BACTERIA UR QL AUTO: ABNORMAL /HPF
BACTERIA UR QL AUTO: ABNORMAL /HPF
BASOPHILS # BLD AUTO: 0.02 10*3/MM3 (ref 0–0.2)
BASOPHILS NFR BLD AUTO: 0.4 % (ref 0–1.5)
BILIRUB SERPL-MCNC: 0.2 MG/DL (ref 0–1.2)
BILIRUB UR QL STRIP: NEGATIVE
BILIRUB UR QL STRIP: NEGATIVE
BLD GP AB SCN SERPL QL: NEGATIVE
BUN SERPL-MCNC: 17.2 MG/DL (ref 6–20)
BUN/CREAT SERPL: 13.8 (ref 7–25)
CALCIUM SPEC-SCNC: 9.6 MG/DL (ref 8.6–10.5)
CHLORIDE SERPL-SCNC: 103 MMOL/L (ref 98–107)
CLARITY UR: ABNORMAL
CLARITY UR: ABNORMAL
CO2 SERPL-SCNC: 27.2 MMOL/L (ref 22–29)
COLOR UR: ABNORMAL
COLOR UR: YELLOW
CREAT SERPL-MCNC: 1.25 MG/DL (ref 0.76–1.27)
DEPRECATED RDW RBC AUTO: 38.6 FL (ref 37–54)
EGFRCR SERPLBLD CKD-EPI 2021: 71.5 ML/MIN/1.73
EOSINOPHIL # BLD AUTO: 0.19 10*3/MM3 (ref 0–0.4)
EOSINOPHIL NFR BLD AUTO: 3.4 % (ref 0.3–6.2)
ERYTHROCYTE [DISTWIDTH] IN BLOOD BY AUTOMATED COUNT: 11.9 % (ref 12.3–15.4)
GLOBULIN UR ELPH-MCNC: 2.8 GM/DL
GLUCOSE SERPL-MCNC: 115 MG/DL (ref 65–99)
GLUCOSE UR STRIP-MCNC: NEGATIVE MG/DL
GLUCOSE UR STRIP-MCNC: NEGATIVE MG/DL
HCT VFR BLD AUTO: 42.5 % (ref 37.5–51)
HGB BLD-MCNC: 15.1 G/DL (ref 13–17.7)
HGB UR QL STRIP.AUTO: ABNORMAL
HGB UR QL STRIP.AUTO: ABNORMAL
HYALINE CASTS UR QL AUTO: ABNORMAL /LPF
HYALINE CASTS UR QL AUTO: ABNORMAL /LPF
IMM GRANULOCYTES # BLD AUTO: 0.03 10*3/MM3 (ref 0–0.05)
IMM GRANULOCYTES NFR BLD AUTO: 0.5 % (ref 0–0.5)
INR PPP: 1.11 (ref 0.9–1.1)
KETONES UR QL STRIP: NEGATIVE
KETONES UR QL STRIP: NEGATIVE
LEUKOCYTE ESTERASE UR QL STRIP.AUTO: NEGATIVE
LEUKOCYTE ESTERASE UR QL STRIP.AUTO: NEGATIVE
LIPASE SERPL-CCNC: 89 U/L (ref 13–60)
LYMPHOCYTES # BLD AUTO: 1.85 10*3/MM3 (ref 0.7–3.1)
LYMPHOCYTES NFR BLD AUTO: 33.2 % (ref 19.6–45.3)
MCH RBC QN AUTO: 31.7 PG (ref 26.6–33)
MCHC RBC AUTO-ENTMCNC: 35.5 G/DL (ref 31.5–35.7)
MCV RBC AUTO: 89.1 FL (ref 79–97)
MONOCYTES # BLD AUTO: 0.64 10*3/MM3 (ref 0.1–0.9)
MONOCYTES NFR BLD AUTO: 11.5 % (ref 5–12)
MUCOUS THREADS URNS QL MICRO: ABNORMAL /HPF
MUCOUS THREADS URNS QL MICRO: ABNORMAL /HPF
NEUTROPHILS NFR BLD AUTO: 2.84 10*3/MM3 (ref 1.7–7)
NEUTROPHILS NFR BLD AUTO: 51 % (ref 42.7–76)
NITRITE UR QL STRIP: NEGATIVE
NITRITE UR QL STRIP: NEGATIVE
NRBC BLD AUTO-RTO: 0 /100 WBC (ref 0–0.2)
PH UR STRIP.AUTO: 5.5 [PH] (ref 4.5–8)
PH UR STRIP.AUTO: 6 [PH] (ref 4.5–8)
PLATELET # BLD AUTO: 165 10*3/MM3 (ref 140–450)
PMV BLD AUTO: 9.2 FL (ref 6–12)
POTASSIUM SERPL-SCNC: 4.1 MMOL/L (ref 3.5–5.2)
PROT SERPL-MCNC: 7.2 G/DL (ref 6–8.5)
PROT UR QL STRIP: ABNORMAL
PROT UR QL STRIP: ABNORMAL
PROTHROMBIN TIME: 14.6 SECONDS (ref 12.1–15)
QT INTERVAL: 400 MS
QTC INTERVAL: 413 MS
RBC # BLD AUTO: 4.77 10*6/MM3 (ref 4.14–5.8)
RBC # UR STRIP: ABNORMAL /HPF
RBC # UR STRIP: ABNORMAL /HPF
REF LAB TEST METHOD: ABNORMAL
REF LAB TEST METHOD: ABNORMAL
RH BLD: POSITIVE
SODIUM SERPL-SCNC: 140 MMOL/L (ref 136–145)
SP GR UR STRIP: 1.02 (ref 1–1.03)
SP GR UR STRIP: 1.03 (ref 1–1.03)
SQUAMOUS #/AREA URNS HPF: ABNORMAL /HPF
SQUAMOUS #/AREA URNS HPF: ABNORMAL /HPF
T&S EXPIRATION DATE: NORMAL
UROBILINOGEN UR QL STRIP: ABNORMAL
UROBILINOGEN UR QL STRIP: ABNORMAL
WAXY CASTS #/AREA URNS LPF: ABNORMAL /LPF
WAXY CASTS #/AREA URNS LPF: ABNORMAL /LPF
WBC # UR STRIP: ABNORMAL /HPF
WBC # UR STRIP: ABNORMAL /HPF
WBC NRBC COR # BLD AUTO: 5.57 10*3/MM3 (ref 3.4–10.8)

## 2025-08-15 PROCEDURE — 25810000003 LACTATED RINGERS PER 1000 ML: Performed by: STUDENT IN AN ORGANIZED HEALTH CARE EDUCATION/TRAINING PROGRAM

## 2025-08-15 PROCEDURE — C2617 STENT, NON-COR, TEM W/O DEL: HCPCS | Performed by: UROLOGY

## 2025-08-15 PROCEDURE — 25810000003 LACTATED RINGERS SOLUTION: Performed by: STUDENT IN AN ORGANIZED HEALTH CARE EDUCATION/TRAINING PROGRAM

## 2025-08-15 PROCEDURE — 96365 THER/PROPH/DIAG IV INF INIT: CPT

## 2025-08-15 PROCEDURE — 25010000002 KETOROLAC TROMETHAMINE PER 15 MG: Performed by: UROLOGY

## 2025-08-15 PROCEDURE — 86850 RBC ANTIBODY SCREEN: CPT | Performed by: STUDENT IN AN ORGANIZED HEALTH CARE EDUCATION/TRAINING PROGRAM

## 2025-08-15 PROCEDURE — 86900 BLOOD TYPING SEROLOGIC ABO: CPT | Performed by: STUDENT IN AN ORGANIZED HEALTH CARE EDUCATION/TRAINING PROGRAM

## 2025-08-15 PROCEDURE — 25510000001 IOPAMIDOL 61 % SOLUTION: Performed by: UROLOGY

## 2025-08-15 PROCEDURE — 25010000002 CEFTRIAXONE PER 250 MG: Performed by: STUDENT IN AN ORGANIZED HEALTH CARE EDUCATION/TRAINING PROGRAM

## 2025-08-15 PROCEDURE — 99235 HOSP IP/OBS SAME DATE MOD 70: CPT

## 2025-08-15 PROCEDURE — 25010000002 KETOROLAC TROMETHAMINE PER 15 MG: Performed by: STUDENT IN AN ORGANIZED HEALTH CARE EDUCATION/TRAINING PROGRAM

## 2025-08-15 PROCEDURE — 80053 COMPREHEN METABOLIC PANEL: CPT | Performed by: STUDENT IN AN ORGANIZED HEALTH CARE EDUCATION/TRAINING PROGRAM

## 2025-08-15 PROCEDURE — 25010000002 ONDANSETRON PER 1 MG

## 2025-08-15 PROCEDURE — 96361 HYDRATE IV INFUSION ADD-ON: CPT

## 2025-08-15 PROCEDURE — 83690 ASSAY OF LIPASE: CPT | Performed by: STUDENT IN AN ORGANIZED HEALTH CARE EDUCATION/TRAINING PROGRAM

## 2025-08-15 PROCEDURE — 25010000002 ONDANSETRON PER 1 MG: Performed by: STUDENT IN AN ORGANIZED HEALTH CARE EDUCATION/TRAINING PROGRAM

## 2025-08-15 PROCEDURE — 86901 BLOOD TYPING SEROLOGIC RH(D): CPT | Performed by: STUDENT IN AN ORGANIZED HEALTH CARE EDUCATION/TRAINING PROGRAM

## 2025-08-15 PROCEDURE — 25010000002 HYDROMORPHONE 1 MG/ML SOLUTION: Performed by: STUDENT IN AN ORGANIZED HEALTH CARE EDUCATION/TRAINING PROGRAM

## 2025-08-15 PROCEDURE — 25010000002 FAMOTIDINE 10 MG/ML SOLUTION

## 2025-08-15 PROCEDURE — 25010000002 FENTANYL CITRATE (PF) 50 MCG/ML SOLUTION: Performed by: NURSE ANESTHETIST, CERTIFIED REGISTERED

## 2025-08-15 PROCEDURE — 96375 TX/PRO/DX INJ NEW DRUG ADDON: CPT

## 2025-08-15 PROCEDURE — 99285 EMERGENCY DEPT VISIT HI MDM: CPT | Performed by: STUDENT IN AN ORGANIZED HEALTH CARE EDUCATION/TRAINING PROGRAM

## 2025-08-15 PROCEDURE — 96360 HYDRATION IV INFUSION INIT: CPT

## 2025-08-15 PROCEDURE — 25010000002 MIDAZOLAM PER 1MG

## 2025-08-15 PROCEDURE — 85730 THROMBOPLASTIN TIME PARTIAL: CPT | Performed by: STUDENT IN AN ORGANIZED HEALTH CARE EDUCATION/TRAINING PROGRAM

## 2025-08-15 PROCEDURE — 93005 ELECTROCARDIOGRAM TRACING: CPT | Performed by: STUDENT IN AN ORGANIZED HEALTH CARE EDUCATION/TRAINING PROGRAM

## 2025-08-15 PROCEDURE — C1758 CATHETER, URETERAL: HCPCS | Performed by: UROLOGY

## 2025-08-15 PROCEDURE — 85025 COMPLETE CBC W/AUTO DIFF WBC: CPT | Performed by: STUDENT IN AN ORGANIZED HEALTH CARE EDUCATION/TRAINING PROGRAM

## 2025-08-15 PROCEDURE — 25810000003 SODIUM CHLORIDE 0.9 % SOLUTION: Performed by: STUDENT IN AN ORGANIZED HEALTH CARE EDUCATION/TRAINING PROGRAM

## 2025-08-15 PROCEDURE — 81001 URINALYSIS AUTO W/SCOPE: CPT | Performed by: STUDENT IN AN ORGANIZED HEALTH CARE EDUCATION/TRAINING PROGRAM

## 2025-08-15 PROCEDURE — 25010000002 LIDOCAINE 2% SOLUTION: Performed by: NURSE ANESTHETIST, CERTIFIED REGISTERED

## 2025-08-15 PROCEDURE — 25810000003 LACTATED RINGERS PER 1000 ML

## 2025-08-15 PROCEDURE — 25010000002 DEXAMETHASONE PER 1 MG

## 2025-08-15 PROCEDURE — C1769 GUIDE WIRE: HCPCS | Performed by: UROLOGY

## 2025-08-15 PROCEDURE — 85610 PROTHROMBIN TIME: CPT | Performed by: STUDENT IN AN ORGANIZED HEALTH CARE EDUCATION/TRAINING PROGRAM

## 2025-08-15 PROCEDURE — G0378 HOSPITAL OBSERVATION PER HR: HCPCS

## 2025-08-15 PROCEDURE — 74176 CT ABD & PELVIS W/O CONTRAST: CPT

## 2025-08-15 PROCEDURE — 74420 UROGRAPHY RTRGR +-KUB: CPT

## 2025-08-15 PROCEDURE — 25010000002 PROPOFOL 10 MG/ML EMULSION: Performed by: NURSE ANESTHETIST, CERTIFIED REGISTERED

## 2025-08-15 DEVICE — URETERAL STENT
Type: IMPLANTABLE DEVICE | Site: URETER | Status: NON-FUNCTIONAL
Brand: CONTOUR™
Removed: 2025-08-22

## 2025-08-15 RX ORDER — ACETAMINOPHEN 500 MG
1000 TABLET ORAL ONCE
Status: COMPLETED | OUTPATIENT
Start: 2025-08-15 | End: 2025-08-15

## 2025-08-15 RX ORDER — KETOROLAC TROMETHAMINE 30 MG/ML
15 INJECTION, SOLUTION INTRAMUSCULAR; INTRAVENOUS ONCE
Status: COMPLETED | OUTPATIENT
Start: 2025-08-15 | End: 2025-08-15

## 2025-08-15 RX ORDER — TAMSULOSIN HYDROCHLORIDE 0.4 MG/1
1 CAPSULE ORAL DAILY
Qty: 30 CAPSULE | Refills: 0 | Status: SHIPPED | OUTPATIENT
Start: 2025-08-15 | End: 2025-08-15

## 2025-08-15 RX ORDER — LIDOCAINE HYDROCHLORIDE 10 MG/ML
0.5 INJECTION, SOLUTION EPIDURAL; INFILTRATION; INTRACAUDAL; PERINEURAL ONCE AS NEEDED
Status: DISCONTINUED | OUTPATIENT
Start: 2025-08-15 | End: 2025-08-15 | Stop reason: HOSPADM

## 2025-08-15 RX ORDER — IOPAMIDOL 612 MG/ML
INJECTION, SOLUTION INTRAVASCULAR AS NEEDED
Status: DISCONTINUED | OUTPATIENT
Start: 2025-08-15 | End: 2025-08-15 | Stop reason: HOSPADM

## 2025-08-15 RX ORDER — OXYCODONE AND ACETAMINOPHEN 7.5; 325 MG/1; MG/1
1 TABLET ORAL EVERY 4 HOURS PRN
Qty: 30 TABLET | Refills: 0 | Status: SHIPPED | OUTPATIENT
Start: 2025-08-15 | End: 2025-08-29

## 2025-08-15 RX ORDER — KETOROLAC TROMETHAMINE 30 MG/ML
15 INJECTION, SOLUTION INTRAMUSCULAR; INTRAVENOUS EVERY 6 HOURS PRN
Status: DISCONTINUED | OUTPATIENT
Start: 2025-08-15 | End: 2025-08-15 | Stop reason: HOSPADM

## 2025-08-15 RX ORDER — SODIUM CHLORIDE 0.9 % (FLUSH) 0.9 %
10 SYRINGE (ML) INJECTION EVERY 12 HOURS SCHEDULED
Status: DISCONTINUED | OUTPATIENT
Start: 2025-08-15 | End: 2025-08-15 | Stop reason: HOSPADM

## 2025-08-15 RX ORDER — ONDANSETRON 2 MG/ML
4 INJECTION INTRAMUSCULAR; INTRAVENOUS ONCE AS NEEDED
Status: COMPLETED | OUTPATIENT
Start: 2025-08-15 | End: 2025-08-15

## 2025-08-15 RX ORDER — HYDROCODONE BITARTRATE AND ACETAMINOPHEN 5; 325 MG/1; MG/1
1 TABLET ORAL EVERY 6 HOURS PRN
Refills: 0 | Status: DISCONTINUED | OUTPATIENT
Start: 2025-08-15 | End: 2025-08-15 | Stop reason: HOSPADM

## 2025-08-15 RX ORDER — DEXMEDETOMIDINE HYDROCHLORIDE 100 UG/ML
INJECTION, SOLUTION INTRAVENOUS AS NEEDED
Status: DISCONTINUED | OUTPATIENT
Start: 2025-08-15 | End: 2025-08-15 | Stop reason: SURG

## 2025-08-15 RX ORDER — SODIUM CHLORIDE 9 MG/ML
40 INJECTION, SOLUTION INTRAVENOUS AS NEEDED
Status: DISCONTINUED | OUTPATIENT
Start: 2025-08-15 | End: 2025-08-15 | Stop reason: HOSPADM

## 2025-08-15 RX ORDER — ACETAMINOPHEN 325 MG/1
650 TABLET ORAL EVERY 6 HOURS PRN
Qty: 30 TABLET | Refills: 0 | Status: SHIPPED | OUTPATIENT
Start: 2025-08-15 | End: 2025-08-15

## 2025-08-15 RX ORDER — OXYCODONE AND ACETAMINOPHEN 5; 325 MG/1; MG/1
1 TABLET ORAL ONCE AS NEEDED
Status: DISCONTINUED | OUTPATIENT
Start: 2025-08-15 | End: 2025-08-15 | Stop reason: HOSPADM

## 2025-08-15 RX ORDER — TAMSULOSIN HYDROCHLORIDE 0.4 MG/1
0.4 CAPSULE ORAL ONCE
Status: DISCONTINUED | OUTPATIENT
Start: 2025-08-15 | End: 2025-08-15 | Stop reason: HOSPADM

## 2025-08-15 RX ORDER — MAGNESIUM HYDROXIDE 1200 MG/15ML
LIQUID ORAL AS NEEDED
Status: DISCONTINUED | OUTPATIENT
Start: 2025-08-15 | End: 2025-08-15 | Stop reason: HOSPADM

## 2025-08-15 RX ORDER — SODIUM CHLORIDE, SODIUM LACTATE, POTASSIUM CHLORIDE, CALCIUM CHLORIDE 600; 310; 30; 20 MG/100ML; MG/100ML; MG/100ML; MG/100ML
75 INJECTION, SOLUTION INTRAVENOUS CONTINUOUS
Status: DISCONTINUED | OUTPATIENT
Start: 2025-08-15 | End: 2025-08-15

## 2025-08-15 RX ORDER — AMOXICILLIN 250 MG
2 CAPSULE ORAL 2 TIMES DAILY PRN
Status: DISCONTINUED | OUTPATIENT
Start: 2025-08-15 | End: 2025-08-15 | Stop reason: HOSPADM

## 2025-08-15 RX ORDER — ACETAMINOPHEN 160 MG/5ML
650 SOLUTION ORAL EVERY 4 HOURS PRN
Status: DISCONTINUED | OUTPATIENT
Start: 2025-08-15 | End: 2025-08-15 | Stop reason: HOSPADM

## 2025-08-15 RX ORDER — IBUPROFEN 600 MG/1
600 TABLET, FILM COATED ORAL EVERY 6 HOURS PRN
Qty: 30 TABLET | Refills: 0 | Status: SHIPPED | OUTPATIENT
Start: 2025-08-15 | End: 2025-08-15

## 2025-08-15 RX ORDER — BISACODYL 5 MG/1
5 TABLET, DELAYED RELEASE ORAL DAILY PRN
Status: DISCONTINUED | OUTPATIENT
Start: 2025-08-15 | End: 2025-08-15 | Stop reason: HOSPADM

## 2025-08-15 RX ORDER — SODIUM CHLORIDE 0.9 % (FLUSH) 0.9 %
10 SYRINGE (ML) INJECTION AS NEEDED
Status: DISCONTINUED | OUTPATIENT
Start: 2025-08-15 | End: 2025-08-15 | Stop reason: HOSPADM

## 2025-08-15 RX ORDER — BISACODYL 10 MG
10 SUPPOSITORY, RECTAL RECTAL DAILY PRN
Status: DISCONTINUED | OUTPATIENT
Start: 2025-08-15 | End: 2025-08-15 | Stop reason: HOSPADM

## 2025-08-15 RX ORDER — OXYCODONE AND ACETAMINOPHEN 5; 325 MG/1; MG/1
1 TABLET ORAL EVERY 6 HOURS PRN
Qty: 12 TABLET | Refills: 0 | Status: SHIPPED | OUTPATIENT
Start: 2025-08-15 | End: 2025-08-15

## 2025-08-15 RX ORDER — DEXAMETHASONE SODIUM PHOSPHATE 4 MG/ML
8 INJECTION, SOLUTION INTRA-ARTICULAR; INTRALESIONAL; INTRAMUSCULAR; INTRAVENOUS; SOFT TISSUE ONCE AS NEEDED
Status: COMPLETED | OUTPATIENT
Start: 2025-08-15 | End: 2025-08-15

## 2025-08-15 RX ORDER — ONDANSETRON 4 MG/1
4 TABLET, ORALLY DISINTEGRATING ORAL EVERY 8 HOURS PRN
Qty: 10 TABLET | Refills: 0 | Status: SHIPPED | OUTPATIENT
Start: 2025-08-15 | End: 2025-08-15

## 2025-08-15 RX ORDER — HYDROMORPHONE HYDROCHLORIDE 1 MG/ML
0.5 INJECTION, SOLUTION INTRAMUSCULAR; INTRAVENOUS; SUBCUTANEOUS
Status: DISCONTINUED | OUTPATIENT
Start: 2025-08-15 | End: 2025-08-15 | Stop reason: HOSPADM

## 2025-08-15 RX ORDER — SODIUM CHLORIDE, SODIUM LACTATE, POTASSIUM CHLORIDE, CALCIUM CHLORIDE 600; 310; 30; 20 MG/100ML; MG/100ML; MG/100ML; MG/100ML
100 INJECTION, SOLUTION INTRAVENOUS ONCE
Status: DISCONTINUED | OUTPATIENT
Start: 2025-08-15 | End: 2025-08-15 | Stop reason: HOSPADM

## 2025-08-15 RX ORDER — FENTANYL CITRATE 50 UG/ML
INJECTION, SOLUTION INTRAMUSCULAR; INTRAVENOUS AS NEEDED
Status: DISCONTINUED | OUTPATIENT
Start: 2025-08-15 | End: 2025-08-15 | Stop reason: SURG

## 2025-08-15 RX ORDER — LIDOCAINE HYDROCHLORIDE 20 MG/ML
INJECTION, SOLUTION INFILTRATION; PERINEURAL AS NEEDED
Status: DISCONTINUED | OUTPATIENT
Start: 2025-08-15 | End: 2025-08-15 | Stop reason: SURG

## 2025-08-15 RX ORDER — ONDANSETRON 2 MG/ML
4 INJECTION INTRAMUSCULAR; INTRAVENOUS ONCE AS NEEDED
Status: DISCONTINUED | OUTPATIENT
Start: 2025-08-15 | End: 2025-08-15 | Stop reason: HOSPADM

## 2025-08-15 RX ORDER — TAMSULOSIN HYDROCHLORIDE 0.4 MG/1
0.4 CAPSULE ORAL ONCE
Status: COMPLETED | OUTPATIENT
Start: 2025-08-15 | End: 2025-08-15

## 2025-08-15 RX ORDER — SODIUM CHLORIDE, SODIUM LACTATE, POTASSIUM CHLORIDE, CALCIUM CHLORIDE 600; 310; 30; 20 MG/100ML; MG/100ML; MG/100ML; MG/100ML
9 INJECTION, SOLUTION INTRAVENOUS CONTINUOUS
Status: DISCONTINUED | OUTPATIENT
Start: 2025-08-15 | End: 2025-08-15

## 2025-08-15 RX ORDER — ACETAMINOPHEN 325 MG/1
650 TABLET ORAL EVERY 4 HOURS PRN
Status: DISCONTINUED | OUTPATIENT
Start: 2025-08-15 | End: 2025-08-15 | Stop reason: HOSPADM

## 2025-08-15 RX ORDER — ONDANSETRON 4 MG/1
4 TABLET, ORALLY DISINTEGRATING ORAL EVERY 6 HOURS PRN
Status: DISCONTINUED | OUTPATIENT
Start: 2025-08-15 | End: 2025-08-15 | Stop reason: HOSPADM

## 2025-08-15 RX ORDER — PROPOFOL 10 MG/ML
VIAL (ML) INTRAVENOUS AS NEEDED
Status: DISCONTINUED | OUTPATIENT
Start: 2025-08-15 | End: 2025-08-15 | Stop reason: SURG

## 2025-08-15 RX ORDER — IBUPROFEN 400 MG/1
600 TABLET, FILM COATED ORAL EVERY 6 HOURS PRN
Status: DISCONTINUED | OUTPATIENT
Start: 2025-08-15 | End: 2025-08-15 | Stop reason: HOSPADM

## 2025-08-15 RX ORDER — LIDOCAINE HYDROCHLORIDE 20 MG/ML
JELLY TOPICAL AS NEEDED
Status: DISCONTINUED | OUTPATIENT
Start: 2025-08-15 | End: 2025-08-15 | Stop reason: HOSPADM

## 2025-08-15 RX ORDER — ACETAMINOPHEN 650 MG/1
650 SUPPOSITORY RECTAL EVERY 4 HOURS PRN
Status: DISCONTINUED | OUTPATIENT
Start: 2025-08-15 | End: 2025-08-15 | Stop reason: HOSPADM

## 2025-08-15 RX ORDER — MIDAZOLAM HYDROCHLORIDE 2 MG/2ML
1 INJECTION, SOLUTION INTRAMUSCULAR; INTRAVENOUS
Status: COMPLETED | OUTPATIENT
Start: 2025-08-15 | End: 2025-08-15

## 2025-08-15 RX ORDER — FAMOTIDINE 10 MG/ML
20 INJECTION, SOLUTION INTRAVENOUS
Status: COMPLETED | OUTPATIENT
Start: 2025-08-15 | End: 2025-08-15

## 2025-08-15 RX ORDER — CEFUROXIME AXETIL 500 MG/1
500 TABLET ORAL 2 TIMES DAILY
Qty: 14 TABLET | Refills: 0 | Status: SHIPPED | OUTPATIENT
Start: 2025-08-15 | End: 2025-08-22

## 2025-08-15 RX ORDER — ONDANSETRON 2 MG/ML
4 INJECTION INTRAMUSCULAR; INTRAVENOUS ONCE
Status: COMPLETED | OUTPATIENT
Start: 2025-08-15 | End: 2025-08-15

## 2025-08-15 RX ORDER — ONDANSETRON 2 MG/ML
4 INJECTION INTRAMUSCULAR; INTRAVENOUS EVERY 6 HOURS PRN
Status: DISCONTINUED | OUTPATIENT
Start: 2025-08-15 | End: 2025-08-15 | Stop reason: HOSPADM

## 2025-08-15 RX ORDER — POLYETHYLENE GLYCOL 3350 17 G/17G
17 POWDER, FOR SOLUTION ORAL DAILY PRN
Status: DISCONTINUED | OUTPATIENT
Start: 2025-08-15 | End: 2025-08-15 | Stop reason: HOSPADM

## 2025-08-15 RX ADMIN — SODIUM CHLORIDE, POTASSIUM CHLORIDE, SODIUM LACTATE AND CALCIUM CHLORIDE 1000 ML: 600; 310; 30; 20 INJECTION, SOLUTION INTRAVENOUS at 05:32

## 2025-08-15 RX ADMIN — ONDANSETRON 4 MG: 2 INJECTION, SOLUTION INTRAMUSCULAR; INTRAVENOUS at 16:44

## 2025-08-15 RX ADMIN — FENTANYL CITRATE 25 MCG: 50 INJECTION, SOLUTION INTRAMUSCULAR; INTRAVENOUS at 17:52

## 2025-08-15 RX ADMIN — FENTANYL CITRATE 25 MCG: 50 INJECTION, SOLUTION INTRAMUSCULAR; INTRAVENOUS at 17:59

## 2025-08-15 RX ADMIN — ACETAMINOPHEN 1000 MG: 500 TABLET, FILM COATED ORAL at 03:55

## 2025-08-15 RX ADMIN — HYDROMORPHONE HYDROCHLORIDE 1 MG: 1 INJECTION, SOLUTION INTRAMUSCULAR; INTRAVENOUS; SUBCUTANEOUS at 02:38

## 2025-08-15 RX ADMIN — ONDANSETRON 4 MG: 2 INJECTION, SOLUTION INTRAMUSCULAR; INTRAVENOUS at 02:35

## 2025-08-15 RX ADMIN — KETOROLAC TROMETHAMINE 15 MG: 30 INJECTION INTRAMUSCULAR; INTRAVENOUS at 11:16

## 2025-08-15 RX ADMIN — PROPOFOL 200 MG: 10 INJECTION, EMULSION INTRAVENOUS at 17:52

## 2025-08-15 RX ADMIN — SODIUM CHLORIDE 1000 ML: 9 INJECTION, SOLUTION INTRAVENOUS at 02:35

## 2025-08-15 RX ADMIN — SODIUM CHLORIDE, POTASSIUM CHLORIDE, SODIUM LACTATE AND CALCIUM CHLORIDE 9 ML/HR: 600; 310; 30; 20 INJECTION, SOLUTION INTRAVENOUS at 16:43

## 2025-08-15 RX ADMIN — FAMOTIDINE 20 MG: 10 INJECTION INTRAVENOUS at 16:44

## 2025-08-15 RX ADMIN — SODIUM CHLORIDE, POTASSIUM CHLORIDE, SODIUM LACTATE AND CALCIUM CHLORIDE 75 ML/HR: 600; 310; 30; 20 INJECTION, SOLUTION INTRAVENOUS at 06:09

## 2025-08-15 RX ADMIN — SODIUM CHLORIDE 2000 MG: 9 INJECTION, SOLUTION INTRAVENOUS at 04:35

## 2025-08-15 RX ADMIN — DEXMEDETOMIDINE 8 MCG: 100 INJECTION, SOLUTION, CONCENTRATE INTRAVENOUS at 17:46

## 2025-08-15 RX ADMIN — MIDAZOLAM HYDROCHLORIDE 1 MG: 1 INJECTION, SOLUTION INTRAMUSCULAR; INTRAVENOUS at 17:40

## 2025-08-15 RX ADMIN — DEXAMETHASONE SODIUM PHOSPHATE 8 MG: 4 INJECTION, SOLUTION INTRAMUSCULAR; INTRAVENOUS at 16:43

## 2025-08-15 RX ADMIN — LIDOCAINE HYDROCHLORIDE 100 MG: 20 INJECTION, SOLUTION INFILTRATION; PERINEURAL at 17:52

## 2025-08-15 RX ADMIN — TAMSULOSIN HYDROCHLORIDE 0.4 MG: 0.4 CAPSULE ORAL at 03:35

## 2025-08-15 RX ADMIN — Medication 10 ML: at 10:08

## 2025-08-15 RX ADMIN — MIDAZOLAM HYDROCHLORIDE 1 MG: 1 INJECTION, SOLUTION INTRAMUSCULAR; INTRAVENOUS at 17:33

## 2025-08-15 RX ADMIN — KETOROLAC TROMETHAMINE 15 MG: 30 INJECTION INTRAMUSCULAR; INTRAVENOUS at 03:55

## 2025-08-22 ENCOUNTER — ANESTHESIA (OUTPATIENT)
Dept: PERIOP | Facility: HOSPITAL | Age: 45
End: 2025-08-22
Payer: OTHER GOVERNMENT

## 2025-08-22 ENCOUNTER — ANESTHESIA EVENT (OUTPATIENT)
Dept: PERIOP | Facility: HOSPITAL | Age: 45
End: 2025-08-22
Payer: OTHER GOVERNMENT

## 2025-08-22 ENCOUNTER — HOSPITAL ENCOUNTER (OUTPATIENT)
Facility: HOSPITAL | Age: 45
Setting detail: HOSPITAL OUTPATIENT SURGERY
Discharge: HOME | End: 2025-08-22
Attending: UROLOGY | Admitting: UROLOGY
Payer: OTHER GOVERNMENT

## 2025-08-22 PROCEDURE — 25010000002 ONDANSETRON PER 1 MG: Performed by: NURSE ANESTHETIST, CERTIFIED REGISTERED

## 2025-08-22 PROCEDURE — 25010000002 PROPOFOL 10 MG/ML EMULSION: Performed by: NURSE ANESTHETIST, CERTIFIED REGISTERED

## 2025-08-22 PROCEDURE — 25010000002 DEXAMETHASONE SODIUM PHOSPHATE 20 MG/5ML SOLUTION: Performed by: NURSE ANESTHETIST, CERTIFIED REGISTERED

## 2025-08-22 PROCEDURE — 25010000002 FENTANYL CITRATE (PF) 50 MCG/ML SOLUTION: Performed by: ANESTHESIOLOGY

## 2025-08-22 PROCEDURE — 25010000002 LIDOCAINE 2% SOLUTION: Performed by: NURSE ANESTHETIST, CERTIFIED REGISTERED

## 2025-08-22 RX ORDER — LIDOCAINE HYDROCHLORIDE 20 MG/ML
INJECTION, SOLUTION INFILTRATION; PERINEURAL AS NEEDED
Status: DISCONTINUED | OUTPATIENT
Start: 2025-08-22 | End: 2025-08-22 | Stop reason: SURG

## 2025-08-22 RX ORDER — PROPOFOL 10 MG/ML
VIAL (ML) INTRAVENOUS AS NEEDED
Status: DISCONTINUED | OUTPATIENT
Start: 2025-08-22 | End: 2025-08-22 | Stop reason: SURG

## 2025-08-22 RX ORDER — DEXAMETHASONE SODIUM PHOSPHATE 4 MG/ML
INJECTION, SOLUTION INTRA-ARTICULAR; INTRALESIONAL; INTRAMUSCULAR; INTRAVENOUS; SOFT TISSUE AS NEEDED
Status: DISCONTINUED | OUTPATIENT
Start: 2025-08-22 | End: 2025-08-22 | Stop reason: SURG

## 2025-08-22 RX ORDER — ONDANSETRON 2 MG/ML
INJECTION INTRAMUSCULAR; INTRAVENOUS AS NEEDED
Status: DISCONTINUED | OUTPATIENT
Start: 2025-08-22 | End: 2025-08-22 | Stop reason: SURG

## 2025-08-22 RX ADMIN — LIDOCAINE HYDROCHLORIDE 80 MG: 20 INJECTION, SOLUTION INFILTRATION; PERINEURAL at 12:20

## 2025-08-22 RX ADMIN — FENTANYL CITRATE 50 MCG: 50 INJECTION, SOLUTION INTRAMUSCULAR; INTRAVENOUS at 12:20

## 2025-08-22 RX ADMIN — PROPOFOL 200 MG: 10 INJECTION, EMULSION INTRAVENOUS at 12:20

## 2025-08-22 RX ADMIN — DEXAMETHASONE SODIUM PHOSPHATE 8 MG: 4 INJECTION, SOLUTION INTRAMUSCULAR; INTRAVENOUS at 12:27

## 2025-08-22 RX ADMIN — ONDANSETRON 4 MG: 2 INJECTION INTRAMUSCULAR; INTRAVENOUS at 12:27

## (undated) DEVICE — DRSNG SURESITE WNDW 4X4.5

## (undated) DEVICE — GLV SURG SENSICARE PI LF PF 7.0

## (undated) DEVICE — 4-PORT MANIFOLD: Brand: NEPTUNE 2

## (undated) DEVICE — Q-FIX REUSABLE 2.8MM PATHFINDER OBTURATOR: Brand: Q-FIX

## (undated) DEVICE — DRAPE,TOWEL,LARGE,INVISISHIELD: Brand: MEDLINE

## (undated) DEVICE — CANNULA THREADED FLEX 5.5 X 72MM: Brand: CLEAR-TRAC

## (undated) DEVICE — HYPODERMIC SAFETY NEEDLE: Brand: MAGELLAN

## (undated) DEVICE — NITINOL WIRE WITH HYDROPHILIC TIP: Brand: SENSOR

## (undated) DEVICE — PK CYSTO 90

## (undated) DEVICE — SUT ETHLN 3/0 PS2 18 IN 1669H

## (undated) DEVICE — ADAPT DB SPIKE 2PCT FOR AR6400 TBG

## (undated) DEVICE — TRAP FLD MINIVAC MEGADYNE 100ML

## (undated) DEVICE — DRESSING,GAUZE,XEROFORM,CURAD,1"X8",ST: Brand: CURAD

## (undated) DEVICE — TOWEL,OR,DSP,ST,BLUE,STD,4/PK,20PK/CS: Brand: MEDLINE

## (undated) DEVICE — 4.5 FULL RADIUS BONECUTTER BLADES,                                    YELLOW, 8000 MAXIMUM RPM, PACKAGED 6                                    PER BOX, STERILE

## (undated) DEVICE — CATH URETRL FLXITP POLLACK STD 5F 70CM

## (undated) DEVICE — GLV SURG SENSICARE PF POLYISPRN SZ8 LF

## (undated) DEVICE — TP CLTH MEDIPORE SFT 4IN 10YD

## (undated) DEVICE — SYR LUERLOK 20CC BX/50

## (undated) DEVICE — PENCL SMOKE/EVAC MEGADYNE TELESCP 10FT

## (undated) DEVICE — APPL CHLORAPREP HI/LITE 26ML ORNG

## (undated) DEVICE — GLV SURG SENSICARE PI PF LF 7 GRN STRL

## (undated) DEVICE — ST TB GOFLO STRL

## (undated) DEVICE — TRANSFER SET 3": Brand: MEDLINE INDUSTRIES, INC.

## (undated) DEVICE — SOL ISO/ALC RUB 70PCT 4OZ

## (undated) DEVICE — DECANTER BAG 9": Brand: MEDLINE INDUSTRIES, INC.

## (undated) DEVICE — ACCU-PASS SUTURE SHUTTLE 45                                    DEGREE, UPBEND, STERILE: Brand: ACCU-PASS

## (undated) DEVICE — INTENDED TO SUPPORT AND MAINTAIN THE POSITION OF AN ANESTHETIZED PATIENT DURING SURGERY: Brand: MARCO SHOULDER STABILIZATION KIT

## (undated) DEVICE — PK SHLDR ARTHSCP 90

## (undated) DEVICE — SPNG GZ WOVN 4X4IN 12PLY 10/BX STRL

## (undated) DEVICE — SOL IRR H2O BTL 1000ML STRL

## (undated) DEVICE — SUT MNCRYL 4/0 PS2 18 IN

## (undated) DEVICE — WRAP SHLDR COMPR COLD/THERP

## (undated) DEVICE — T-MAX DISPOSABLE FACE MASK 8 PER BOX

## (undated) DEVICE — SOL IRR H2O BO 1000ML STRL

## (undated) DEVICE — SYS CLS SKIN PREMIERPRO EXOFINFUSION 22CM

## (undated) DEVICE — INTENT TO BE USED WITH SUTURE MATERIAL FOR TISSUE CLOSURE: Brand: RICHARD-ALLAN® NEEDLE 1/2 CIRCLE TAPER

## (undated) DEVICE — DRP SURG U/DRP INVISISHIELD PA 48X52IN

## (undated) DEVICE — SUT VIC 3/0 SH CR8 18IN J864D

## (undated) DEVICE — SYR LUERLOK 50ML

## (undated) DEVICE — WEREWOLF FLOW 90 COBLATION WAND: Brand: COBLATION

## (undated) DEVICE — GLOVE,SURG,SENSICARE,ALOE,LF,PF,7: Brand: MEDLINE

## (undated) DEVICE — SUT PDS 0 CT1 36IN Z346H